# Patient Record
Sex: FEMALE | Race: WHITE | HISPANIC OR LATINO | Employment: FULL TIME | URBAN - METROPOLITAN AREA
[De-identification: names, ages, dates, MRNs, and addresses within clinical notes are randomized per-mention and may not be internally consistent; named-entity substitution may affect disease eponyms.]

---

## 2017-01-20 ENCOUNTER — OFFICE VISIT (OUTPATIENT)
Dept: FAMILY MEDICINE | Facility: OTHER | Age: 42
End: 2017-01-20
Payer: COMMERCIAL

## 2017-01-20 VITALS
BODY MASS INDEX: 33.49 KG/M2 | RESPIRATION RATE: 12 BRPM | WEIGHT: 189 LBS | DIASTOLIC BLOOD PRESSURE: 70 MMHG | HEIGHT: 63 IN | TEMPERATURE: 98.2 F | OXYGEN SATURATION: 100 % | HEART RATE: 76 BPM | SYSTOLIC BLOOD PRESSURE: 92 MMHG

## 2017-01-20 DIAGNOSIS — Z12.31 ENCOUNTER FOR SCREENING MAMMOGRAM FOR BREAST CANCER: ICD-10-CM

## 2017-01-20 DIAGNOSIS — Z00.00 ENCOUNTER FOR ROUTINE ADULT HEALTH EXAMINATION WITHOUT ABNORMAL FINDINGS: Primary | ICD-10-CM

## 2017-01-20 DIAGNOSIS — J45.30 MILD PERSISTENT ASTHMA WITHOUT COMPLICATION: ICD-10-CM

## 2017-01-20 PROCEDURE — 99386 PREV VISIT NEW AGE 40-64: CPT | Performed by: PHYSICIAN ASSISTANT

## 2017-01-20 ASSESSMENT — PAIN SCALES - GENERAL: PAINLEVEL: NO PAIN (0)

## 2017-01-20 NOTE — NURSING NOTE
"Chief Complaint   Patient presents with     Physical       Initial BP 92/70 mmHg  Pulse 76  Temp(Src) 98.2  F (36.8  C) (Oral)  Resp 12  Ht 5' 2.99\" (1.6 m)  Wt 189 lb (85.73 kg)  BMI 33.49 kg/m2  SpO2 100% Estimated body mass index is 33.49 kg/(m^2) as calculated from the following:    Height as of this encounter: 5' 2.99\" (1.6 m).    Weight as of this encounter: 189 lb (85.73 kg).  BP completed using cuff size: regular  "

## 2017-01-20 NOTE — PROGRESS NOTES
SUBJECTIVE:     CC: Florentino Mondragon is an 41 year old woman who presents for preventive health visit.     Physical  Annual:     Getting at least 3 servings of Calcium per day::  Yes    Bi-annual eye exam::  Yes    Dental care twice a year::  Yes    Sleep apnea or symptoms of sleep apnea::  None    Diet::  Regular (no restrictions)    Frequency of exercise::  4-5 days/week    Duration of exercise::  45-60 minutes    Taking medications regularly::  Yes    Medication side effects::  None    Additional concerns today::  YES      Pt would like provider to listen to heart  - States after working out she gets dark circles under her eyes  - Has goggled it and thinks its a murmur or arrhythmia   - No fatigue, no heart racing   - Had panic attacks before   - No pain   - Doesn't cause her to get off exercise     Asthma Follow-Up    Was ACT completed today?    Yes    ACT Total Scores 4/29/2016   ACT TOTAL SCORE -   ASTHMA ER VISITS -   ASTHMA HOSPITALIZATIONS -   ACT TOTAL SCORE (Goal Greater than or Equal to 20) 25   In the past 12 months, how many times did you visit the emergency room for your asthma without being admitted to the hospital? 0   In the past 12 months, how many times were you hospitalized overnight because of your asthma? 0             Today's PHQ-2 Score:   PHQ-2 ( 1999 Pfizer) 4/29/2016   Q1: Little interest or pleasure in doing things 0   Q2: Feeling down, depressed or hopeless 0   PHQ-2 Score 0       Abuse: Current or Past(Physical, Sexual or Emotional)- No  Do you feel safe in your environment - Yes    Social History   Substance Use Topics     Smoking status: Former Smoker     Quit date: 03/10/1999     Smokeless tobacco: Never Used     Alcohol Use: Yes      Comment: rare     The patient does not drink >3 drinks per day nor >7 drinks per week.    Recent Labs   Lab Test  11/19/14   0902  05/20/10   0844   CHOL  165  184   HDL  53  39*   LDL  101  127   TRIG  56  91   CHOLHDLRATIO  3.1  5.0       Reviewed  "orders with patient.  Reviewed health maintenance and updated orders accordingly - Yes    Mammo Decision Support:  Patient under age 50, mutual decision reflected in health maintenance.      Pertinent mammograms are reviewed under the imaging tab.  History of abnormal Pap smear: NO - age 30-65 PAP every 5 years with negative HPV co-testing recommended  All Histories reviewed and updated in Epic.  Past Medical History   Diagnosis Date     Seizure disorder (H) 6/17/2008     Colon polyps 12/3/14     Diverticulosis 12/3/14      Past Surgical History   Procedure Laterality Date     Lasik       3/18/2010     Gyn surgery  2000     LEEP      Colonoscopy with co2 insufflation N/A 12/3/2014     Procedure: COLONOSCOPY WITH CO2 INSUFFLATION;  Surgeon: Duane, William Charles, MD;  Location: [facility] OR       ROS:  C: NEGATIVE for fever, chills, change in weight  I: NEGATIVE for worrisome rashes, moles or lesions  E: NEGATIVE for vision changes or irritation  ENT: NEGATIVE for ear, mouth and throat problems  R: NEGATIVE for significant cough or SOB  B: NEGATIVE for masses, tenderness or discharge  CV: NEGATIVE for chest pain, palpitations or peripheral edema  GI: NEGATIVE for nausea, abdominal pain, heartburn, or change in bowel habits  : NEGATIVE for unusual urinary or vaginal symptoms. Periods are regular.  M: NEGATIVE for significant arthralgias or myalgia  N: NEGATIVE for weakness, dizziness or paresthesias  P: NEGATIVE for changes in mood or affect    Problem list, Medication list, Allergies, and Medical/Social/Surgical histories reviewed in [facility] and updated as appropriate.  Labs reviewed in EPIC  OBJECTIVE:     BP 92/70 mmHg  Pulse 76  Temp(Src) 98.2  F (36.8  C) (Oral)  Resp 12  Ht 5' 2.99\" (1.6 m)  Wt 189 lb (85.73 kg)  BMI 33.49 kg/m2  SpO2 100%  EXAM:  GENERAL: healthy, alert and no distress  EYES: Eyes grossly normal to inspection, PERRL and conjunctivae and sclerae normal  HENT: ear canals and TM's normal, nose and " "mouth without ulcers or lesions  NECK: no adenopathy, no asymmetry, masses, or scars and thyroid normal to palpation  RESP: lungs clear to auscultation - no rales, rhonchi or wheezes  CV: regular rate and rhythm, normal S1 S2, no S3 or S4, no murmur, click or rub, no peripheral edema and peripheral pulses strong  ABDOMEN: soft, nontender, no hepatosplenomegaly, no masses and bowel sounds normal  MS: no gross musculoskeletal defects noted, no edema  SKIN: no suspicious lesions or rashes  NEURO: Normal strength and tone, mentation intact and speech normal  PSYCH: mentation appears normal, affect normal/bright    ASSESSMENT/PLAN:         ICD-10-CM    1. Encounter for routine adult health examination without abnormal findings Z00.00    2. Encounter for screening mammogram for breast cancer Z12.31 *MA Screening Digital Bilateral   3. Mild persistent asthma without complication J45.30        - OK to refill albuterol when needed   - Patient to schedule mammogram  - Assured patient no cardiac concerns on exam today, likely dark circles from electrolytes/dehydration     COUNSELING:  Reviewed preventive health counseling, as reflected in patient instructions  Special attention given to:        Regular exercise       Healthy diet/nutrition         reports that she quit smoking about 17 years ago. She has never used smokeless tobacco.    Estimated body mass index is 31.29 kg/(m^2) as calculated from the following:    Height as of 4/29/16: 5' 2.64\" (1.591 m).    Weight as of 4/29/16: 174 lb 9.6 oz (79.198 kg).   Weight management plan: Discussed healthy diet and exercise guidelines and patient will follow up in 12 months in clinic to re-evaluate.    Counseling Resources:  ATP IV Guidelines  Pooled Cohorts Equation Calculator  Breast Cancer Risk Calculator  FRAX Risk Assessment  ICSI Preventive Guidelines  Dietary Guidelines for Americans, 2010  USDA's MyPlate  ASA Prophylaxis  Lung CA Screening    Chelsea Cueva, " THA  [facility]    Answers for HPI/ROS submitted by the patient on 1/20/2017   PHQ-2 Depressed: Not at all, Not at all  PHQ-2 Score: 0

## 2017-01-21 ASSESSMENT — ASTHMA QUESTIONNAIRES: ACT_TOTALSCORE: 22

## 2018-01-21 ENCOUNTER — HEALTH MAINTENANCE LETTER (OUTPATIENT)
Age: 43
End: 2018-01-21

## 2018-05-01 ENCOUNTER — TELEPHONE (OUTPATIENT)
Dept: FAMILY MEDICINE | Facility: OTHER | Age: 43
End: 2018-05-01

## 2018-05-01 NOTE — TELEPHONE ENCOUNTER
Panel Management Review      Patient has the following on her problem list:     Asthma review     ACT Total Scores 1/20/2017   ACT TOTAL SCORE -   ASTHMA ER VISITS -   ASTHMA HOSPITALIZATIONS -   ACT TOTAL SCORE (Goal Greater than or Equal to 20) 22   In the past 12 months, how many times did you visit the emergency room for your asthma without being admitted to the hospital? 0   In the past 12 months, how many times were you hospitalized overnight because of your asthma? 0      1. Is Asthma diagnosis on the Problem List? Yes    2. Is Asthma listed on Health Maintenance? Yes    3. Patient is due for:  ACT and AAP      Composite cancer screening  Chart review shows that this patient is due/due soon for the following Pap Smear and Mammogram  Summary:    Patient is due/failing the following:   AAP, ACT, MAMMOGRAM and PHYSICAL    Action needed:   Patient needs office visit for physical, pap, ACT.    Type of outreach:    Phone, spoke to patient.  scheduled for physical 5/8/18    Questions for provider review:    None                                                                                                                                    Shasha Reed CMA        Chart routed to Care Team .

## 2018-05-14 ENCOUNTER — TELEPHONE (OUTPATIENT)
Dept: FAMILY MEDICINE | Facility: OTHER | Age: 43
End: 2018-05-14

## 2019-06-13 NOTE — PATIENT INSTRUCTIONS
- Schedule fasting labs     - Schedule mammogram     - Schedule pap only appointment     - Colonoscopy Dec 2019, call for order       Preventive Health Recommendations  Female Ages 40 to 49    Yearly exam:     See your health care provider every year in order to  1. Review health changes.   2. Discuss preventive care.    3. Review your medicines if your doctor prescribed any.      Get a Pap test every three years (unless you have an abnormal result and your provider advises testing more often).      If you get Pap tests with HPV test, you only need to test every 5 years, unless you have an abnormal result. You do not need a Pap test if your uterus was removed (hysterectomy) and you have not had cancer.      You should be tested each year for STDs (sexually transmitted diseases), if you're at risk.     Ask your doctor if you should have a mammogram.      Have a colonoscopy (test for colon cancer) if someone in your family has had colon cancer or polyps before age 50.       Have a cholesterol test every 5 years.       Have a diabetes test (fasting glucose) after age 45. If you are at risk for diabetes, you should have this test every 3 years.    Shots: Get a flu shot each year. Get a tetanus shot every 10 years.     Nutrition:     Eat at least 5 servings of fruits and vegetables each day.    Eat whole-grain bread, whole-wheat pasta and brown rice instead of white grains and rice.    Get adequate Calcium and Vitamin D.      Lifestyle    Exercise at least 150 minutes a week (an average of 30 minutes a day, 5 days a week). This will help you control your weight and prevent disease.    Limit alcohol to one drink per day.    No smoking.     Wear sunscreen to prevent skin cancer.    See your dentist every six months for an exam and cleaning.  Patient Education     Understanding Menopause  Menopause marks the point where you ve gone 12 months in a row without a period. The average age for this is around 51, but it can  happen at younger or older ages. During the months or years before menopause, your body goes through many changes. It may be helpful to understand these changes and what you can do about the symptoms that result.     Use a portable fan to help stay cool.    Symptoms  Perimenopause is sometimes called the menopause transition. It happens in the months or years before menopause. It may begin when you reach your mid-40s. During this time, your estrogen levels go up and down and then decrease. As a result, you may notice some of the following symptoms:  Menstrual periods that come more or less often than usual  Menstrual periods that are lighter or heavier than normal  Increased premenstrual syndrome (PMS) symptoms  Hot flashes  Night sweats  Mood swings  Vaginal dryness with possible painful sexual activity  Difficulty going to sleep or staying asleep  Decreased sexual drive and function  Urinating frequently  It is important to remember that you could become pregnant until 12 months have passed since your last menstrual period. Ask your healthcare provider about birth control choices.   Controlling symptoms  Your healthcare provider may suggest pills or an intrauterine device (IUD) that contain the hormone progesterone. This can make your periods more regular and prevent excess bleeding. If you have symptoms due to lower estrogen levels, your healthcare provider may suggest pills that contain estrogen and/or progesterone. This is called hormone therapy (HT).  There are also other prescription medicines that help control some of the bothersome symptoms, like hot flashes, mood swings, and vaginal dryness.  Other ways for you to deal with symptoms are listed below.  Hot flashes. Wear layers that you can remove. Try all-cotton clothing, sheets, and blankets. Keep a glass of cold water by your bed.  Pain during sex. You can buy a water-based lubricant or vaginal moisturizer in the drugstore that may help. Your healthcare  provider may also prescribe an estrogen cream for your vagina.  Mood swings. Talking to friends who are going through the same changes can sometimes help.  Date Last Reviewed: 12/1/2016 2000-2018 [employer]. [address] [place] [address], [place] [address]. All rights reserved. This information is not intended as a substitute for professional medical care. Always follow your healthcare professional's instructions.           Patient Education     Coping with Symptoms of Menopause  What symptoms of menopause may occur with my treatment?  Chemotherapy drugs or medicines that block hormones may bring on menopause.  These changes may include:  Hot flashes  Vaginal dryness  Trouble falling asleep (insomnia)  Headache  Depression.  How are hot flashes treated?  Your doctor may suggest medicine to control the hot flashes. Vitamins E, B6 or C may also help. Talk to your doctor about how much to take.  Some herbs or foods may help: primrose oil, garlic, chamomile, ginseng root, royal jelly or soy.  What else can I do to cope with hot flashes?  Wear clothes made of natural fabric such as cotton.  Dress in layers. You can remove them when you feel too warm.  Avoid hot drinks (coffee or tea) and spicy foods.  Keep the room temperature low if you can.  Control your stress. Exercise and relaxation techniques may help.  Keep track of when and how often hot flashes occur. Note what is happening right before a hot flash. This may give you some control over future hot flashes.  How is vaginal dryness treated?  You can try drugstore products such as Replens, Gyne-Moistrin or Lubrin. They last for about three days.  Use water-based lubricants during sex. These include Astroglide and K-Y Jelly. Do not use Vaseline or petroleum jelly because they are not good for vaginal tissues.  Use low-dose estrogen cream in the vagina. Your doctor must prescribe this medicine.  How can I cope if I have trouble sleeping?  Get in bed when you  are ready to go to sleep. Do not watch TV or read in bed.  Follow a sleeping routine: Go to bed and get up at the same times. Get up on time even if you did not sleep well.  If you do not fall asleep within 30 minutes, get up.  Get regular exercise. Do not exercise right before bedtime.  Avoid alcohol. It may disrupt your sleep.  Try natural remedies just before bedtime such as warm milk, chamomile tea or verbena tea.  How can I treat headache?  Ask your doctor if it is safe to take aspirin, Tylenol (acetaminophen) or Advil (ibuprofen). They may cause side effects when mixed with chemotherapy.  How can I cope with depression?  Mild depression  Start an exercise program. Exercise with a friend. Any activity is better than none. Walk to the mailbox, to see your neighbors or in the mall.  Share your feelings with family and friends.  Don't give in to negative feelings.  Severe depression  If your depression lasts longer than two weeks, talk to your care team. They may suggest counseling or medicine.  Comments:  __________________________________________  __________________________________________  __________________________________________  __________________________________________  __________________________________________  __________________________________________  __________________________________________  __________________________________________  For informational purposes only. Not to replace the advice of your health care provider.  Copyright   2010 [employer]. All rights reserved. [employer] 804408 - 12/15.  For informational purposes only. Not to replace the advice of your health care provider.  Copyright   2018 [employer]. All rights reserved.

## 2019-06-13 NOTE — PROGRESS NOTES
SUBJECTIVE:   CC: Florentino Mondragon is an 43 year old woman who presents for preventive health visit.     Healthy Habits:     Getting at least 3 servings of Calcium per day:  Yes    Bi-annual eye exam:  Yes    Dental care twice a year:  Yes    Sleep apnea or symptoms of sleep apnea:  None    Diet:  Regular (no restrictions)    Frequency of exercise:  4-5 days/week    Duration of exercise:  45-60 minutes    Taking medications regularly:  Yes    Medication side effects:  None    PHQ-2 Total Score: 0    Additional concerns today:  Yes    - No pap today per patient, just started period     - HPV warts on bikini lines (3 of them), treated in office in the past         - Referral to neurology for refill of medications     - Hot flashes just started     Periods regular       Asthma Follow-Up    Was ACT completed today?    Yes    ACT Total Scores 6/20/2019   ACT TOTAL SCORE -   ASTHMA ER VISITS -   ASTHMA HOSPITALIZATIONS -   ACT TOTAL SCORE (Goal Greater than or Equal to 20) 24   In the past 12 months, how many times did you visit the emergency room for your asthma without being admitted to the hospital? 0   In the past 12 months, how many times were you hospitalized overnight because of your asthma? 0       How many days per week do you miss taking your asthma controller medication?  I do not have an asthma controller medication    Please describe any recent triggers for your asthma: dust mites and animal dander    Have you had any Emergency Room Visits, Urgent Care Visits, or Hospital Admissions since your last office visit?  No        Today's PHQ-2 Score:   PHQ-2 ( 1999 Pfizer) 1/20/2017   Q1: Little interest or pleasure in doing things -   Q2: Feeling down, depressed or hopeless -   PHQ-2 Score -   Q1: Little interest or pleasure in doing things Not at all   Q2: Feeling down, depressed or hopeless Not at all   PHQ-2 Score 0       Abuse: Current or Past(Physical, Sexual or Emotional)- No  Do you feel safe in your  environment? Yes    Social History     Tobacco Use     Smoking status: Former Smoker     Last attempt to quit: 3/10/1999     Years since quittin.2     Smokeless tobacco: Never Used   Substance Use Topics     Alcohol use: Yes     Comment: rare     If you drink alcohol do you typically have >3 drinks per day or >7 drinks per week? No    Alcohol Use 2017   Prescreen: >3 drinks/day or >7 drinks/week? The patient does not drink >3 drinks per day nor >7 drinks per week.   No flowsheet data found.    Reviewed orders with patient.  Reviewed health maintenance and updated orders accordingly - Yes  Lab work is in process  Labs reviewed in EPIC  BP Readings from Last 3 Encounters:   17 92/70   16 114/72   12/16/15 110/66    Wt Readings from Last 3 Encounters:   17 85.7 kg (189 lb)   16 79.2 kg (174 lb 9.6 oz)   12/16/15 85.1 kg (187 lb 9.6 oz)            Mammogram Screening: Patient under age 50, mutual decision reflected in health maintenance.      Pertinent mammograms are reviewed under the imaging tab.  History of abnormal Pap smear:   Last 3 Pap Results:   PAP (no units)   Date Value   2014 NIL   2011 NIL   2010 NIL     PAP / HPV 2014   PAP NIL NIL NIL     Cervical dysplasia - had LEEP 18 years ago        Reviewed and updated as needed this visit by clinical staff  Allergies  Meds  Problems  Med Hx  Surg Hx         Reviewed and updated as needed this visit by Provider  Allergies  Meds  Problems  Med Hx  Surg Hx        Past Medical History:   Diagnosis Date     Colon polyps 12/3/14     Diverticulosis 12/3/14     Seizure disorder (H) 2008      Past Surgical History:   Procedure Laterality Date     COLONOSCOPY WITH CO2 INSUFFLATION N/A 12/3/2014    Procedure: COLONOSCOPY WITH CO2 INSUFFLATION;  Surgeon: Duane, William Charles, MD;  Location: [facility] OR     GYN SURGERY      LEEP      LASIK      3/18/2010       Review of Systems  "  Constitutional: Negative for chills and fever.   HENT: Negative for congestion, ear pain, hearing loss and sore throat.    Eyes: Negative for pain and visual disturbance.   Respiratory: Negative for cough and shortness of breath.    Cardiovascular: Negative for chest pain, palpitations and peripheral edema.   Gastrointestinal: Negative for abdominal pain, constipation, diarrhea, heartburn, hematochezia and nausea.   Breasts:  Negative for tenderness, breast mass and discharge.   Genitourinary: Negative for dysuria, frequency, genital sores, hematuria, pelvic pain, urgency, vaginal bleeding and vaginal discharge.   Musculoskeletal: Negative for arthralgias, joint swelling and myalgias.   Skin: Negative for rash.   Neurological: Negative for dizziness, weakness, headaches and paresthesias.   Psychiatric/Behavioral: Negative for mood changes. The patient is not nervous/anxious.    All other systems reviewed and are negative.         OBJECTIVE:   /74 (BP Location: Left arm, Patient Position: Chair, Cuff Size: Adult Regular)   Pulse 87   Temp 97.6  F (36.4  C) (Temporal)   Resp 16   Ht 1.58 m (5' 2.21\")   Wt 75.8 kg (167 lb)   LMP 06/20/2019   SpO2 98%   BMI 30.34 kg/m    Physical Exam   Constitutional: She is oriented to person, place, and time. She appears well-developed and well-nourished. No distress.   HENT:   Right Ear: Tympanic membrane and external ear normal.   Left Ear: Tympanic membrane and external ear normal.   Nose: Nose normal.   Mouth/Throat: Oropharynx is clear and moist. No oropharyngeal exudate.   Eyes: Pupils are equal, round, and reactive to light. Conjunctivae are normal. Right eye exhibits no discharge. Left eye exhibits no discharge.   Neck: Neck supple. No tracheal deviation present. No thyromegaly present.   Cardiovascular: Normal rate, regular rhythm, S1 normal, S2 normal, normal heart sounds and normal pulses. Exam reveals no S3, no S4 and no friction rub.   No murmur " heard.  Pulmonary/Chest: Effort normal and breath sounds normal. No respiratory distress. She has no wheezes. She has no rales.   Abdominal: Soft. Bowel sounds are normal. She exhibits no mass. There is no hepatosplenomegaly. There is no tenderness.   Musculoskeletal: Normal range of motion. She exhibits no edema.   Lymphadenopathy:     She has no cervical adenopathy.   Neurological: She is alert and oriented to person, place, and time. She has normal strength and normal reflexes. She exhibits normal muscle tone.   Skin: Skin is warm and dry. No rash noted.   Psychiatric: She has a normal mood and affect. Judgment and thought content normal. Cognition and memory are normal.   patient declined breast and pelvic exam today, will schedule for another time     Diagnostic Test Results:  Labs reviewed in Epic  See orders pending in Epic     ASSESSMENT/PLAN:       ICD-10-CM    1. Routine general medical examination at a health care facility Z00.00    2. Intermittent asthma, uncomplicated J45.20 albuterol (PROAIR HFA/PROVENTIL HFA/VENTOLIN HFA) 108 (90 Base) MCG/ACT inhaler   3. Screening for lipid disorders Z13.220 Lipid panel reflex to direct LDL Fasting   4. Screening for diabetes mellitus Z13.1 **Glucose FUTURE 2mo   5. Encounter for screening mammogram for breast cancer Z12.31 *MA Screening Digital Bilateral   6. Seizure disorder (H) G40.909 Lamotrigine Level     NEUROLOGY ADULT REFERRAL     lamoTRIgine (LAMICTAL) 100 MG tablet   7. Genital warts due to HPV (human papillomavirus) A63.0    8. Perimenopause N95.1    9. Menopausal syndrome (hot flashes) N95.1    10. External hemorrhoids K64.4    11. History of colonic  - colonoscopy every 5 years Z86.010      - Asthma     Stable, reviewed albuterol use and side effects     - Referral to neurology given     - Schedule fasting labs     - Schedule mammogram     - Schedule pap only appointment - will treat HPV warts and look at hemorrhoid at that time per patient request  "    - Colonoscopy Dec 2019, call for order         COUNSELING:  Reviewed preventive health counseling, as reflected in patient instructions  Special attention given to:        Regular exercise       Healthy diet/nutrition       Colon cancer screening       (Sepideh)menopause management    Estimated body mass index is 33.49 kg/m  as calculated from the following:    Height as of 1/20/17: 1.6 m (5' 2.99\").    Weight as of 1/20/17: 85.7 kg (189 lb).    Weight management plan: Discussed healthy diet and exercise guidelines     reports that she quit smoking about 20 years ago. She has never used smokeless tobacco.      Counseling Resources:  ATP IV Guidelines  Pooled Cohorts Equation Calculator  Breast Cancer Risk Calculator  FRAX Risk Assessment  ICSI Preventive Guidelines  Dietary Guidelines for Americans, 2010  USDA's MyPlate  ASA Prophylaxis  Lung CA Screening    Chelsea Cueva PA-C  [facility]  "

## 2019-06-20 ENCOUNTER — OFFICE VISIT (OUTPATIENT)
Dept: FAMILY MEDICINE | Facility: OTHER | Age: 44
End: 2019-06-20
Payer: COMMERCIAL

## 2019-06-20 VITALS
OXYGEN SATURATION: 98 % | TEMPERATURE: 97.6 F | WEIGHT: 167 LBS | DIASTOLIC BLOOD PRESSURE: 74 MMHG | SYSTOLIC BLOOD PRESSURE: 124 MMHG | BODY MASS INDEX: 30.73 KG/M2 | RESPIRATION RATE: 16 BRPM | HEART RATE: 87 BPM | HEIGHT: 62 IN

## 2019-06-20 DIAGNOSIS — N95.1 MENOPAUSAL SYNDROME (HOT FLASHES): ICD-10-CM

## 2019-06-20 DIAGNOSIS — A63.0 GENITAL WARTS DUE TO HPV (HUMAN PAPILLOMAVIRUS): ICD-10-CM

## 2019-06-20 DIAGNOSIS — Z13.1 SCREENING FOR DIABETES MELLITUS: ICD-10-CM

## 2019-06-20 DIAGNOSIS — K64.4 EXTERNAL HEMORRHOIDS: ICD-10-CM

## 2019-06-20 DIAGNOSIS — Z86.0100 HISTORY OF COLONIC POLYPS: ICD-10-CM

## 2019-06-20 DIAGNOSIS — Z13.220 SCREENING FOR LIPID DISORDERS: ICD-10-CM

## 2019-06-20 DIAGNOSIS — N95.1 PERIMENOPAUSE: ICD-10-CM

## 2019-06-20 DIAGNOSIS — J45.20 INTERMITTENT ASTHMA, UNCOMPLICATED: ICD-10-CM

## 2019-06-20 DIAGNOSIS — Z12.31 ENCOUNTER FOR SCREENING MAMMOGRAM FOR BREAST CANCER: ICD-10-CM

## 2019-06-20 DIAGNOSIS — G40.909 SEIZURE DISORDER (H): ICD-10-CM

## 2019-06-20 DIAGNOSIS — Z00.00 ROUTINE GENERAL MEDICAL EXAMINATION AT A HEALTH CARE FACILITY: Primary | ICD-10-CM

## 2019-06-20 PROCEDURE — 99396 PREV VISIT EST AGE 40-64: CPT | Performed by: PHYSICIAN ASSISTANT

## 2019-06-20 RX ORDER — LAMOTRIGINE 100 MG/1
100 TABLET ORAL 2 TIMES DAILY
Qty: 60 TABLET | Refills: 2 | Status: SHIPPED | OUTPATIENT
Start: 2019-06-20

## 2019-06-20 RX ORDER — ALBUTEROL SULFATE 90 UG/1
1-2 AEROSOL, METERED RESPIRATORY (INHALATION) EVERY 4 HOURS PRN
Qty: 3 INHALER | Refills: 3 | Status: SHIPPED | OUTPATIENT
Start: 2019-06-20 | End: 2020-02-06

## 2019-06-20 ASSESSMENT — ENCOUNTER SYMPTOMS
PALPITATIONS: 0
FEVER: 0
HEARTBURN: 0
WEAKNESS: 0
CHILLS: 0
NAUSEA: 0
HEMATOCHEZIA: 0
PARESTHESIAS: 0
FREQUENCY: 0
DYSURIA: 0
ARTHRALGIAS: 0
SHORTNESS OF BREATH: 0
HEADACHES: 0
MYALGIAS: 0
CONSTIPATION: 0
NERVOUS/ANXIOUS: 0
SORE THROAT: 0
JOINT SWELLING: 0
HEMATURIA: 0
EYE PAIN: 0
DIARRHEA: 0
BREAST MASS: 0
ABDOMINAL PAIN: 0
COUGH: 0
DIZZINESS: 0

## 2019-06-20 ASSESSMENT — ASTHMA QUESTIONNAIRES
QUESTION_2 LAST FOUR WEEKS HOW OFTEN HAVE YOU HAD SHORTNESS OF BREATH: NOT AT ALL
ACT_TOTALSCORE: 24
ACUTE_EXACERBATION_TODAY: NO
QUESTION_4 LAST FOUR WEEKS HOW OFTEN HAVE YOU USED YOUR RESCUE INHALER OR NEBULIZER MEDICATION (SUCH AS ALBUTEROL): ONCE A WEEK OR LESS
QUESTION_5 LAST FOUR WEEKS HOW WOULD YOU RATE YOUR ASTHMA CONTROL: COMPLETELY CONTROLLED
QUESTION_1 LAST FOUR WEEKS HOW MUCH OF THE TIME DID YOUR ASTHMA KEEP YOU FROM GETTING AS MUCH DONE AT WORK, SCHOOL OR AT HOME: NONE OF THE TIME
QUESTION_3 LAST FOUR WEEKS HOW OFTEN DID YOUR ASTHMA SYMPTOMS (WHEEZING, COUGHING, SHORTNESS OF BREATH, CHEST TIGHTNESS OR PAIN) WAKE YOU UP AT NIGHT OR EARLIER THAN USUAL IN THE MORNING: NOT AT ALL

## 2019-06-20 ASSESSMENT — MIFFLIN-ST. JEOR: SCORE: 1354.01

## 2019-06-20 ASSESSMENT — PAIN SCALES - GENERAL: PAINLEVEL: NO PAIN (0)

## 2019-06-20 NOTE — LETTER
My Asthma Action Plan  Name: Florentino Mondragon   YOB: 1975  Date: 6/20/2019   My doctor: Chelsea Cueva PA-C   My clinic: [facility]        My Control Medicine: None  My Rescue Medicine: Albuterol (Proair/Ventolin/Proventil) inhaler 1-2 puffs every 4 horus as needed    My Asthma Severity: mild persistent  Avoid your asthma triggers:                GREEN ZONE   Good Control    I feel good    No cough or wheeze    Can work, sleep and play without asthma symptoms       Take your asthma control medicine every day.     1. If exercise triggers your asthma, take your rescue medication    15 minutes before exercise or sports, and    During exercise if you have asthma symptoms  2. Spacer to use with inhaler: If you have a spacer, make sure to use it with your inhaler             YELLOW ZONE Getting Worse  I have ANY of these:    I do not feel good    Cough or wheeze    Chest feels tight    Wake up at night   1. Keep taking your Green Zone medications  2. Start taking your rescue medicine:    every 20 minutes for up to 1 hour. Then every 4 hours for 24-48 hours.  3. If you stay in the Yellow Zone for more than 12-24 hours, contact your doctor.  4. If you do not return to the Green Zone in 12-24 hours or you get worse, start taking your oral steroid medicine if prescribed by your provider.           RED ZONE Medical Alert - Get Help  I have ANY of these:    I feel awful    Medicine is not helping    Breathing getting harder    Trouble walking or talking    Nose opens wide to breathe       1. Take your rescue medicine NOW  2. If your provider has prescribed an oral steroid medicine, start taking it NOW  3. Call your doctor NOW  4. If you are still in the Red Zone after 20 minutes and you have not reached your doctor:    Take your rescue medicine again and    Call 911 or go to the emergency room right away    See your regular doctor within 2 weeks of an Emergency Room or Urgent Care  visit for follow-up treatment.          Annual Reminders:  Meet with Asthma Educator,  Flu Shot in the Fall, consider Pneumonia Vaccination for patients with asthma (aged 19 and older).    [facility] [id number] [facility] [id number] AT [facility] OF  & [address]                      Asthma Triggers  How To Control Things That Make Your Asthma Worse    Triggers are things that make your asthma worse.  Look at the list below to help you find your triggers and what you can do about them.  You can help prevent asthma flare-ups by staying away from your triggers.      Trigger                                                          What you can do   Cigarette Smoke  Tobacco smoke can make asthma worse. Do not allow smoking in your home, car or around you.  Be sure no one smokes at a child s day care or school.  If you smoke, ask your health care provider for ways to help you quit.  Ask family members to quit too.  Ask your health care provider for a referral to Quit Plan to help you quit smoking, or call 7-741-659-PLAN.     Colds, Flu, Bronchitis  These are common triggers of asthma. Wash your hands often.  Don t touch your eyes, nose or mouth.  Get a flu shot every year.     Dust Mites  These are tiny bugs that live in cloth or carpet. They are too small to see. Wash sheets and blankets in hot water every week.   Encase pillows and mattress in dust mite proof covers.  Avoid having carpet if you can. If you have carpet, vacuum weekly.   Use a dust mask and HEPA vacuum.   Pollen and Outdoor Mold  Some people are allergic to trees, grass, or weed pollen, or molds. Try to keep your windows closed.  Limit time out doors when pollen count is high.   Ask you health care provider about taking medicine during allergy season.     Animal Dander  Some people are allergic to skin flakes, urine or saliva from pets with fur or feathers. Keep pets with fur or feathers out of your home.    If you can t keep the  pet outdoors, then keep the pet out of your bedroom.  Keep the bedroom door closed.  Keep pets off cloth furniture and away from stuffed toys.     Mice, Rats, and Cockroaches  Some people are allergic to the waste from these pests.   Cover food and garbage.  Clean up spills and food crumbs.  Store grease in the refrigerator.   Keep food out of the bedroom.   Indoor Mold  This can be a trigger if your home has high moisture. Fix leaking faucets, pipes, or other sources of water.   Clean moldy surfaces.  Dehumidify basement if it is damp and smelly.   Smoke, Strong Odors, and Sprays  These can reduce air quality. Stay away from strong odors and sprays, such as perfume, powder, hair spray, paints, smoke incense, paint, cleaning products, candles and new carpet.   Exercise or Sports  Some people with asthma have this trigger. Be active!  Ask your doctor about taking medicine before sports or exercise to prevent symptoms.    Warm up for 5-10 minutes before and after sports or exercise.     Other Triggers of Asthma  Cold air:  Cover your nose and mouth with a scarf.  Sometimes laughing or crying can be a trigger.  Some medicines and food can trigger asthma.

## 2019-06-21 ASSESSMENT — ASTHMA QUESTIONNAIRES: ACT_TOTALSCORE: 24

## 2019-08-15 ENCOUNTER — TELEPHONE (OUTPATIENT)
Dept: FAMILY MEDICINE | Facility: OTHER | Age: 44
End: 2019-08-15

## 2019-08-21 ENCOUNTER — TELEPHONE (OUTPATIENT)
Dept: FAMILY MEDICINE | Facility: OTHER | Age: 44
End: 2019-08-21

## 2019-08-21 NOTE — TELEPHONE ENCOUNTER
You placed a referral to [facility] on 6/20/19.    It is unclear if the patient has scheduled yet, not finding a report showing they were seen.     Please forward to your team if further follow up is needed to see if they have made this appointment.      Thank you!   Leora/Referral Representative for [facility]

## 2019-09-05 NOTE — PROGRESS NOTES
"Subjective     Florentino Mondragon is a 44 year old female who presents to clinic today for the following health issues:    History of Present Illness        She eats 2-3 servings of fruits and vegetables daily.She consumes 0 sweetened beverage(s) daily.  She is taking medications regularly.     -pap only today  -burn off HPV?      Review of Systems   ROS COMP: Constitutional, HEENT, cardiovascular, pulmonary, gi and gu systems are negative, except as otherwise noted.      Objective    /62   Pulse 106   Temp 97.9  F (36.6  C) (Temporal)   Resp 16   Ht 1.58 m (5' 2.21\")   Wt 68.2 kg (150 lb 6.4 oz)   LMP 08/08/2019   SpO2 97%   BMI 27.32 kg/m    Body mass index is 27.32 kg/m .  Physical Exam   GENERAL APPEARANCE: healthy, alert and no distress  EYES: Eyes grossly normal to inspection, PERRLA, conjunctivae and sclerae without injection or discharge, EOM intact     (FEMALE): Normal female external genitalia, - 3 brown raised genital warts located on right outer labia vaginal mucosa pink, moist, well rugated, normal vaginal discharge present, normal cervix visualized without any signs of abnormal discharge, bimanual exam reveals normal adnexae and uterus without masses.   Rectal: moderate sized non-thrombosed external hemorrhoid noted at 12 o'clock position    MS: No musculoskeletal defects are noted and gait is age appropriate without ataxia   SKIN: No suspicious lesions or rashes, hydration status appears adeuqate with normal skin turgor   PSYCH: Alert and oriented x3; speech- coherent , normal rate and volume; able to articulate logical thoughts, able to abstract reason, no tangential thoughts, no hallucinations or delusions, mentation appears normal, Mood is euthymic. Affect is appropriate for this mood state and bright. Thought content is free of suicidal ideation, hallucinations, and delusions. Dress is adequate and upkept. Eye contact is good during conversation.       Diagnostic Test Results:  Labs " reviewed in Epic  See orders pending in Epic       Procedure note:   Procedure was discussed with patient. Site(s) as described above were identified and cleaned with alcohol.          Wart(s) were encircled by anti-biotic ointment to create a dam. Cantharidin was placed in center directly on wart. Area was covered by bandage. Patient tolerated procedure well.           Patient was given instruction on after care and hand out on warts.     Assessment & Plan       ICD-10-CM    1. Screening for malignant neoplasm of cervix Z12.4 Pap imaged thin layer screen with HPV - recommended age 30 - 65 years (select HPV order below)     HPV High Risk Types DNA Cervical   2. 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 External hemorrhoids K64.4 GENERAL SURG ADULT REFERRAL     1. Pap collected     2. Genital warts   - Treated as above after verbal consent obtained and discussing risks and benefits of treatments   - Patient instructed to wash with soap in 2 hours   - Discussed at home care if warts persist with podofilox  - Discussed when to start this and side effects   - Hand out given   - Discussed if persist, could try liquid nitrogen or surgical removal     3. External hemorrhoid   - Bothersome to patient and often bleeds   - Discussed etiology and treatment options, patient would like to proceed with possible treatment so referral to general surgery placed     The patient indicates understanding of these issues and agrees with the plan.    Follow up: PRN and 1 year for physical     Chelsea Sewell-THA Posadas  [facility]

## 2019-09-12 ENCOUNTER — OFFICE VISIT (OUTPATIENT)
Dept: FAMILY MEDICINE | Facility: OTHER | Age: 44
End: 2019-09-12
Payer: COMMERCIAL

## 2019-09-12 VITALS
BODY MASS INDEX: 27.68 KG/M2 | WEIGHT: 150.4 LBS | SYSTOLIC BLOOD PRESSURE: 118 MMHG | HEIGHT: 62 IN | HEART RATE: 106 BPM | RESPIRATION RATE: 16 BRPM | DIASTOLIC BLOOD PRESSURE: 62 MMHG | OXYGEN SATURATION: 97 % | TEMPERATURE: 97.9 F

## 2019-09-12 DIAGNOSIS — A63.0 GENITAL WARTS DUE TO HPV (HUMAN PAPILLOMAVIRUS): ICD-10-CM

## 2019-09-12 DIAGNOSIS — Z12.4 SCREENING FOR MALIGNANT NEOPLASM OF CERVIX: Primary | ICD-10-CM

## 2019-09-12 DIAGNOSIS — K64.4 EXTERNAL HEMORRHOIDS: ICD-10-CM

## 2019-09-12 PROCEDURE — 87624 HPV HI-RISK TYP POOLED RSLT: CPT | Performed by: PHYSICIAN ASSISTANT

## 2019-09-12 PROCEDURE — 99213 OFFICE O/P EST LOW 20 MIN: CPT | Mod: 25 | Performed by: PHYSICIAN ASSISTANT

## 2019-09-12 PROCEDURE — 56501 DESTROY VULVA LESIONS SIM: CPT | Performed by: PHYSICIAN ASSISTANT

## 2019-09-12 PROCEDURE — G0145 SCR C/V CYTO,THINLAYER,RESCR: HCPCS | Performed by: PHYSICIAN ASSISTANT

## 2019-09-12 RX ORDER — PODOFILOX 5 MG/G
GEL TOPICAL 2 TIMES DAILY
Qty: 3.5 G | Refills: 1 | Status: SHIPPED | OUTPATIENT
Start: 2019-09-12 | End: 2019-11-11

## 2019-09-12 ASSESSMENT — PAIN SCALES - GENERAL: PAINLEVEL: NO PAIN (0)

## 2019-09-12 ASSESSMENT — MIFFLIN-ST. JEOR: SCORE: 1278.79

## 2019-09-12 NOTE — PATIENT INSTRUCTIONS
- Wash entire area off with soap in 2 hours   - Area may blister, if it does, do not pop, keep covered    - Wait about 2 weeks, if no change or returning, start treatment with Podofilox         Apply twice daily (morning and evening) for 3 consecutive days, then withhold use for 4 consecutive days; this cycle may be repeated up to 4 times until there is no visible wart tissue;    - Call for appointment with general surgery               Patient Education     Hemorrhoids    Hemorrhoids are swollen and inflamed veins inside the rectum and near the anus. The rectum is the last several inches of the colon. The anus is the passage between the rectum and the outside of the body.  Causes  The veins can become swollen due to increased pressure in them. This is most often caused by:    Chronic constipation or diarrhea    Straining when having a bowel movement    Sitting too long on the toilet    A low-fiber diet    Pregnancy  Symptoms    Bleeding from the rectum (this may be noticeable after bowel movements)    Lump near the anus    Itching around the anus    Pain around the anus  There are different types of hemorrhoids. Depending on the type you have and the severity, you may be able to treat yourself at home. In some cases, a procedure may be the best treatment option. Your healthcare provider can tell you more about this, if needed.  Home care  General care    To get relief from pain or itching, try:  ? Medicines. Your healthcare provider may recommend stool softeners, suppositories, or laxatives to help manage constipation. Use these exactly as directed.  ? Sitz baths. A sitz bath involves sitting in a few inches of warm bath water. Be careful not to make the water so hot that you burn yourself--test it before sitting in it. Soak for about 10 to 15 minutes a few times a day. This may help relieve pain.  ? Topical products. Your healthcare provider may prescribe or recommend creams, ointments, or pads that can be applied  to the hemorrhoid. Use these exactly as directed.  Tips to help prevent hemorrhoids    Eat more fiber. Fiber adds bulk to stool and absorbs water as it moves through your colon. This makes stool softer and easier to pass.  ? Increase the fiber in your diet with more fiber-rich foods. These include fresh fruit, vegetables, and whole grains.  ? Take a fiber supplement or bulking agent, if advised by your healthcare provider. These include products such as psyllium or methylcellulose.    Drink more water. Your healthcare provider may direct you to drink plenty of water. This can help keep stool soft.    Be more active. Frequent exercise aids digestion and helps prevent constipation. It may also help make bowel movements more regular.    Don t strain during bowel movements. This can make hemorrhoids more likely. Also, don t sit on the toilet for long periods of time.  Follow-up care  Follow up with your healthcare provider as advised. If a culture or imaging tests were done, someone will let you know the results when they are ready. This may take a few days or longer. If your healthcare provider recommends a procedure for your hemorrhoids, these options can be discussed. Options may include surgery and outpatient office treatments.  When to seek medical advice  Call your healthcare provider right away if any of these occur:    Increased bleeding from the rectum    Increased pain around the rectum or anus    Weakness or dizziness  Call 911  Call 911 if any of these occur:    Trouble breathing or swallowing    Fainting or loss of consciousness    Unusually fast heart rate    Vomiting blood    Large amounts of blood in stool or black, tarry stools  Date Last Reviewed: 9/1/2017 2000-2018 [employer]. [address] [place] [address], [place] [address]. All rights reserved. This information is not intended as a substitute for professional medical care. Always follow your healthcare professional's instructions.

## 2019-09-18 LAB
COPATH REPORT: NORMAL
FINAL DIAGNOSIS: NORMAL
HPV HR 12 DNA CVX QL NAA+PROBE: NEGATIVE
HPV16 DNA SPEC QL NAA+PROBE: NEGATIVE
HPV18 DNA SPEC QL NAA+PROBE: NEGATIVE
PAP: NORMAL
SPECIMEN DESCRIPTION: NORMAL
SPECIMEN SOURCE CVX/VAG CYTO: NORMAL

## 2019-10-30 ENCOUNTER — TELEPHONE (OUTPATIENT)
Dept: FAMILY MEDICINE | Facility: OTHER | Age: 44
End: 2019-10-30

## 2019-10-30 NOTE — TELEPHONE ENCOUNTER
Panel Management Review    Summary:    Patient is due/failing the following:   Glucose, Lamotrigine Level, LDL and MAMMOGRAM    Action needed:   Patient needs fasting lab only appointment  Patient needs referral/order: Mammogram    Type of outreach:    Phone, left message for patient to call back.     Questions for provider review:    None                                                                                                                                    Arely Peck CMA ([facility])       Chart routed to Care Team .      Patient has the following on her problem list:     Asthma review     ACT Total Scores 6/20/2019   ACT TOTAL SCORE -   ASTHMA ER VISITS -   ASTHMA HOSPITALIZATIONS -   ACT TOTAL SCORE (Goal Greater than or Equal to 20) 24   In the past 12 months, how many times did you visit the emergency room for your asthma without being admitted to the hospital? 0   In the past 12 months, how many times were you hospitalized overnight because of your asthma? 0      1. Is Asthma diagnosis on the Problem List? Yes    2. Is Asthma listed on Health Maintenance? Yes    3. Patient is due for:  none      Composite cancer screening  Chart review shows that this patient is due/due soon for the following Mammogram

## 2019-10-31 NOTE — TELEPHONE ENCOUNTER
Patient informed. She will come in for a lab appointment. She didn't want to set up an appointment.   Also printed referrals for general surgery like she requested. Placed at the  for .

## 2019-11-04 ENCOUNTER — TELEPHONE (OUTPATIENT)
Dept: FAMILY MEDICINE | Facility: OTHER | Age: 44
End: 2019-11-04

## 2019-11-04 NOTE — TELEPHONE ENCOUNTER
Reason for Call:  Other call back    Detailed comments: Patient is looking for the name of the female doctor that CDL said would be a good fit for external hemorrhoid procedure. Patient stated okay to leave detailed VM with name of doctor.    Phone Number Patient can be reached at: Home number on file [phone] (home)    Best Time: any    Can we leave a detailed message on this number? YES    Call taken on 11/4/2019 at 8:13 AM by Halie Way

## 2019-11-07 ENCOUNTER — HEALTH MAINTENANCE LETTER (OUTPATIENT)
Age: 44
End: 2019-11-07

## 2019-11-11 ENCOUNTER — OFFICE VISIT (OUTPATIENT)
Dept: SURGERY | Facility: OTHER | Age: 44
End: 2019-11-11
Payer: COMMERCIAL

## 2019-11-11 VITALS
TEMPERATURE: 97.4 F | SYSTOLIC BLOOD PRESSURE: 130 MMHG | DIASTOLIC BLOOD PRESSURE: 76 MMHG | HEIGHT: 62 IN | BODY MASS INDEX: 26.31 KG/M2 | WEIGHT: 143 LBS

## 2019-11-11 DIAGNOSIS — Z13.1 SCREENING FOR DIABETES MELLITUS: ICD-10-CM

## 2019-11-11 DIAGNOSIS — A63.0 GENITAL WARTS DUE TO HPV (HUMAN PAPILLOMAVIRUS): ICD-10-CM

## 2019-11-11 DIAGNOSIS — Z13.220 SCREENING FOR LIPID DISORDERS: ICD-10-CM

## 2019-11-11 DIAGNOSIS — Z86.0100 HISTORY OF COLONIC POLYPS: ICD-10-CM

## 2019-11-11 DIAGNOSIS — G40.909 SEIZURE DISORDER (H): ICD-10-CM

## 2019-11-11 DIAGNOSIS — K64.4 EXTERNAL HEMORRHOIDS: Primary | ICD-10-CM

## 2019-11-11 LAB
CHOLEST SERPL-MCNC: 121 MG/DL
GLUCOSE SERPL-MCNC: 79 MG/DL (ref 70–99)
HDLC SERPL-MCNC: 57 MG/DL
LDLC SERPL CALC-MCNC: 55 MG/DL
NONHDLC SERPL-MCNC: 64 MG/DL
TRIGL SERPL-MCNC: 43 MG/DL

## 2019-11-11 PROCEDURE — 82947 ASSAY GLUCOSE BLOOD QUANT: CPT | Performed by: PHYSICIAN ASSISTANT

## 2019-11-11 PROCEDURE — 80175 DRUG SCREEN QUAN LAMOTRIGINE: CPT | Mod: 90 | Performed by: PHYSICIAN ASSISTANT

## 2019-11-11 PROCEDURE — 36415 COLL VENOUS BLD VENIPUNCTURE: CPT | Performed by: PHYSICIAN ASSISTANT

## 2019-11-11 PROCEDURE — 99000 SPECIMEN HANDLING OFFICE-LAB: CPT | Performed by: PHYSICIAN ASSISTANT

## 2019-11-11 PROCEDURE — 99204 OFFICE O/P NEW MOD 45 MIN: CPT | Performed by: SURGERY

## 2019-11-11 PROCEDURE — 80061 LIPID PANEL: CPT | Performed by: PHYSICIAN ASSISTANT

## 2019-11-11 ASSESSMENT — MIFFLIN-ST. JEOR: SCORE: 1241.89

## 2019-11-11 NOTE — LETTER
11/11/2019         RE: Florentino Mondragon  [address]  [facility] [place] [facility] [address]        Dear Colleague,    Thank you for referring your patient, Florentino Mondragon, to the [facility]. Please see a copy of my visit note below.    General Surgery Consultation    Florentino Mondragon MRN# [id number]   Age: 44 year old YOB: 1975     Reason for consult: External hemorrhoids and right groin skin lesion (previous HPV lesions)                        Assessment and Plan:   I was asked to see this patient at the request of Chapito Cueva  for evaluation of external hemorrhoids and right groin skin lesion .  Florentino Mondragon is a 44 year old female who presented with history, exam, laboratory and imaging most consistent with .       ICD-10-CM    1. External hemorrhoids K64.4    2. 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 History of colonic  - colonoscopy every 5 years Z86.010        Patient has very small external hemorrhoids and one small anterior midline skin tag; I do not recommend a hemorrhoidectomy at this point.  I recommend that she follows the conservative management as described below.  We discussed all the points in details all of her questions were answered regarding conservative management of her hemorrhoids.    -ingest 20 to 30 g of insoluble fiber (benefiber or metamucil) per day and drink plenty of water (1.5 to 2 liters per day). Both are necessary to produce regular, soft stools, which reduce straining at defecation. It could take six weeks to fully realize the beneficial effect of fiber   -refrain from straining or lingering (eg, reading) on the toilet.  - have regular physical exercise  - limit their intake of fatty foods and alcohol, which can exacerbate constipation   - Sitz baths can relieve irritation and pruritus as well as spasm of the anal sphincter muscles. Used with warm, rather than cold, water two to three times per day    Patient also has a right  groin skin lesion.  Stated she has history of genital warts secondary to HPV.  Patient has tried to burn this off the solution given by her primary care provider.  However she cannot afford that at this time.  This lesion is approximately 1.5 cm by half a centimeter.  Thus patient would like to have this be excised.  We talked about the risks, benefits, alternatives of the procedure the patient would like to proceed.  We will schedule an additional appointment just for the procedure.    I thank LYNSEY Renner  for the opportunity to participate in the patient's care.           Chief Complaint:   External hemorrhoids     History is obtained from the patient         History of Present Illness:   This patient is a 44 year old  female with a significant past medical history of HPV genital warts who presents with external hemorrhoids.    Past two years - get constipated intermittent.  WHen they flare-up - no pain, no itching, palpable, intermittent bleeding. Noted bleeding when she wipes.  No pattern for flare up.  Stool somewhat form.  Do read in toilet intermittent.  Does not do any bowel regimen unless she needs to.  NO crohn's and IBD; maternal aunt has crohn's and colitis (?related to her breast implants.).  Had colonoscopy ~5 years ago.  No hx of abscess, fissues, infection in perianal area.  No intra-abdominal surgeries.  Genital warps along the underwear line - 3x along right side underwear line - increasing in size.            Past Medical History:    has a past medical history of Colon polyps (12/3/14), Diverticulosis (12/3/14), and Seizure disorder (H) (6/17/2008).          Past Surgical History:     Past Surgical History:   Procedure Laterality Date     COLONOSCOPY WITH CO2 INSUFFLATION N/A 12/3/2014    Procedure: COLONOSCOPY WITH CO2 INSUFFLATION;  Surgeon: Duane, William Charles, MD;  Location: [facility] OR     GYN SURGERY  2000    LEEP      LASIK      3/18/2010           Medications:    lamoTRIgine (LAMICTAL) 100 MG tablet, Take 1 tablet (100 mg) by mouth 2 times daily  albuterol (PROAIR HFA/PROVENTIL HFA/VENTOLIN HFA) 108 (90 Base) MCG/ACT inhaler, Inhale 1-2 puffs into the lungs every 4 hours as needed for shortness of breath / dyspnea or wheezing (Patient not taking: Reported on 11/11/2019)  IBUPROFEN PO,   loratadine (CLARITIN) 10 MG tablet, Take 10 mg by mouth daily.    No current facility-administered medications on file prior to visit.         Allergies:      Allergies   Allergen Reactions     Dilantin [Phenytoin]      Dogs      Dust Mites      Keppra      Phenytoin      Seasonal Allergies      Trees             Social History:   Florentino Mondragon  reports that she quit smoking about 20 years ago. She has never used smokeless tobacco. She reports current alcohol use. She reports that she does not use drugs.          Family History:   The patient has no family history of any bleeding, clotting or anesthesia problems.          Review of Systems:     Constitutional: Denies fever or chills   Eyes: Denies change in visual acuity   HENT: Denies nasal congestion or sore throat   Respiratory: Denies cough or shortness of breath   Cardiovascular: Denies chest pain or edema   GI: Denies abdominal pain, nausea, vomiting, bloody stools or diarrhea   : Denies dysuria   Musculoskeletal: Denies back pain or joint pain   Integument: Denies rash   Neurologic: Denies headache, focal weakness or sensory changes   Endocrine: Denies polyuria or polydipsia   Lymphatic: Denies swollen glands   Psychiatric: Denies depression or anxiety          Physical Exam:     Constitutional: Awake, alert, no acute distress.  Eyes:  No scleral icterus.  Conjunctiva are without injection.  ENMT: Mucous membranes moist, dentition and gums are intact.   Neck: Soft, supple, trachea midline.    Endocrine: The thyroid is without masses and mobile with swallow.   Lymphatic: There is no cervical, submandibular, or inguinal  "adenopathy.  Respiratory: Lungs are clear to auscultation and percussion bilaterally.   Cardiovascular: Regular rate and rhythm. No murmurs, rubs, or gallops.    Abdomen: Non-distended, non-tender, normoactive bowel sounds present, No masses, neghernias, or flank tenderness. No hepatosplenomegaly.   Perianal and Anoscopy: no perianal irritation; anterior midline skin tag with small external hemorrhoid that noted some erythema but no signs of thrombosis.  No internal hemorrhoids noted; no fissure; no discharge; no masses; no bleeding noted.    Musculoskeletal: No spinal or CVA tenderness. Full range of motion in the upper and lower extremities.    Skin: No skin rashes or lesions to inspection.  No petechia.  Right groin - 1.5x0.5cm skin lesion likely genital wart.   Neurologic: Cranial nerves II through XII are grossly intact and symmetric.  Psychiatric: The patient is alert and oriented times 3.  The patient's affect is not blunted and mood is appropriate.          Data:   Vital Signs:  /76   Temp 97.4  F (36.3  C) (Temporal)   Ht 1.575 m (5' 2\")   Wt 64.9 kg (143 lb)   BMI 26.16 kg/m        WBC -   WBC   Date Value Ref Range Status   11/19/2014 5.7 4.0 - 11.0 10e9/L Final   ], HgB -   Hemoglobin   Date Value Ref Range Status   11/19/2014 11.6 (L) 11.7 - 15.7 g/dL Final   ]   Liver Function Studies -   Recent Labs   Lab Test 11/19/14  0902   PROTTOTAL 6.8   ALBUMIN 3.6   BILITOTAL 0.2   ALKPHOS 71   AST 10   ALT 18     No results found for this or any previous visit (from the past 744 hour(s)).     Mackenzie Salinas DO 11/11/2019 9:03 AM           Again, thank you for allowing me to participate in the care of your patient.        Sincerely,        Mackenzie Salinas MD    "

## 2019-11-11 NOTE — PROGRESS NOTES
General Surgery Consultation    Florentino Mondragon MRN# [id number]   Age: 44 year old YOB: 1975     Reason for consult: External hemorrhoids and right groin skin lesion (previous HPV lesions)                        Assessment and Plan:   I was asked to see this patient at the request of Chapito Cueva  for evaluation of external hemorrhoids and right groin skin lesion .  Florentino Mondragon is a 44 year old female who presented with history, exam, laboratory and imaging most consistent with .       ICD-10-CM    1. External hemorrhoids K64.4    2. 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 History of colonic  - colonoscopy every 5 years Z86.010        Patient has very small external hemorrhoids and one small anterior midline skin tag; I do not recommend a hemorrhoidectomy at this point.  I recommend that she follows the conservative management as described below.  We discussed all the points in details all of her questions were answered regarding conservative management of her hemorrhoids.    -ingest 20 to 30 g of insoluble fiber (benefiber or metamucil) per day and drink plenty of water (1.5 to 2 liters per day). Both are necessary to produce regular, soft stools, which reduce straining at defecation. It could take six weeks to fully realize the beneficial effect of fiber   -refrain from straining or lingering (eg, reading) on the toilet.  - have regular physical exercise  - limit their intake of fatty foods and alcohol, which can exacerbate constipation   - Sitz baths can relieve irritation and pruritus as well as spasm of the anal sphincter muscles. Used with warm, rather than cold, water two to three times per day    Patient also has a right groin skin lesion.  Stated she has history of genital warts secondary to HPV.  Patient has tried to burn this off the solution given by her primary care provider.  However she cannot afford that at this time.  This lesion is approximately 1.5  cm by half a centimeter.  Thus patient would like to have this be excised.  We talked about the risks, benefits, alternatives of the procedure the patient would like to proceed.  We will schedule an additional appointment just for the procedure.    I thank LYNSEY Renner  for the opportunity to participate in the patient's care.           Chief Complaint:   External hemorrhoids     History is obtained from the patient         History of Present Illness:   This patient is a 44 year old  female with a significant past medical history of HPV genital warts who presents with external hemorrhoids.    Past two years - get constipated intermittent.  WHen they flare-up - no pain, no itching, palpable, intermittent bleeding. Noted bleeding when she wipes.  No pattern for flare up.  Stool somewhat form.  Do read in toilet intermittent.  Does not do any bowel regimen unless she needs to.  NO crohn's and IBD; maternal aunt has crohn's and colitis (?related to her breast implants.).  Had colonoscopy ~5 years ago.  No hx of abscess, fissues, infection in perianal area.  No intra-abdominal surgeries.  Genital warps along the underwear line - 3x along right side underwear line - increasing in size.            Past Medical History:    has a past medical history of Colon polyps (12/3/14), Diverticulosis (12/3/14), and Seizure disorder (H) (6/17/2008).          Past Surgical History:     Past Surgical History:   Procedure Laterality Date     COLONOSCOPY WITH CO2 INSUFFLATION N/A 12/3/2014    Procedure: COLONOSCOPY WITH CO2 INSUFFLATION;  Surgeon: Duane, William Charles, MD;  Location: [facility] OR     GYN SURGERY  2000    LEEP      LASIK      3/18/2010           Medications:   lamoTRIgine (LAMICTAL) 100 MG tablet, Take 1 tablet (100 mg) by mouth 2 times daily  albuterol (PROAIR HFA/PROVENTIL HFA/VENTOLIN HFA) 108 (90 Base) MCG/ACT inhaler, Inhale 1-2 puffs into the lungs every 4 hours as needed for shortness of breath  / dyspnea or wheezing (Patient not taking: Reported on 11/11/2019)  IBUPROFEN PO,   loratadine (CLARITIN) 10 MG tablet, Take 10 mg by mouth daily.    No current facility-administered medications on file prior to visit.         Allergies:      Allergies   Allergen Reactions     Dilantin [Phenytoin]      Dogs      Dust Mites      Keppra      Phenytoin      Seasonal Allergies      Trees             Social History:   Florentino Mondragon  reports that she quit smoking about 20 years ago. She has never used smokeless tobacco. She reports current alcohol use. She reports that she does not use drugs.          Family History:   The patient has no family history of any bleeding, clotting or anesthesia problems.          Review of Systems:     Constitutional: Denies fever or chills   Eyes: Denies change in visual acuity   HENT: Denies nasal congestion or sore throat   Respiratory: Denies cough or shortness of breath   Cardiovascular: Denies chest pain or edema   GI: Denies abdominal pain, nausea, vomiting, bloody stools or diarrhea   : Denies dysuria   Musculoskeletal: Denies back pain or joint pain   Integument: Denies rash   Neurologic: Denies headache, focal weakness or sensory changes   Endocrine: Denies polyuria or polydipsia   Lymphatic: Denies swollen glands   Psychiatric: Denies depression or anxiety          Physical Exam:     Constitutional: Awake, alert, no acute distress.  Eyes:  No scleral icterus.  Conjunctiva are without injection.  ENMT: Mucous membranes moist, dentition and gums are intact.   Neck: Soft, supple, trachea midline.    Endocrine: The thyroid is without masses and mobile with swallow.   Lymphatic: There is no cervical, submandibular, or inguinal adenopathy.  Respiratory: Lungs are clear to auscultation and percussion bilaterally.   Cardiovascular: Regular rate and rhythm. No murmurs, rubs, or gallops.    Abdomen: Non-distended, non-tender, normoactive bowel sounds present, No masses, neghernias, or  "flank tenderness. No hepatosplenomegaly.   Perianal and Anoscopy: no perianal irritation; anterior midline skin tag with small external hemorrhoid that noted some erythema but no signs of thrombosis.  No internal hemorrhoids noted; no fissure; no discharge; no masses; no bleeding noted.    Musculoskeletal: No spinal or CVA tenderness. Full range of motion in the upper and lower extremities.    Skin: No skin rashes or lesions to inspection.  No petechia.  Right groin - 1.5x0.5cm skin lesion likely genital wart.   Neurologic: Cranial nerves II through XII are grossly intact and symmetric.  Psychiatric: The patient is alert and oriented times 3.  The patient's affect is not blunted and mood is appropriate.          Data:   Vital Signs:  /76   Temp 97.4  F (36.3  C) (Temporal)   Ht 1.575 m (5' 2\")   Wt 64.9 kg (143 lb)   BMI 26.16 kg/m       WBC -   WBC   Date Value Ref Range Status   11/19/2014 5.7 4.0 - 11.0 10e9/L Final   ], HgB -   Hemoglobin   Date Value Ref Range Status   11/19/2014 11.6 (L) 11.7 - 15.7 g/dL Final   ]   Liver Function Studies -   Recent Labs   Lab Test 11/19/14  0902   PROTTOTAL 6.8   ALBUMIN 3.6   BILITOTAL 0.2   ALKPHOS 71   AST 10   ALT 18     No results found for this or any previous visit (from the past 744 hour(s)).     [facility] ramy 11/11/2019 9:03 AM           "

## 2019-11-12 LAB — LAMOTRIGINE SERPL-MCNC: 2.6 UG/ML (ref 2.5–15)

## 2019-11-12 NOTE — RESULT ENCOUNTER NOTE
Jay Good    Your results were normal.     The results are attached for your review.       Chapito Cueva PA-C

## 2019-11-15 ENCOUNTER — OFFICE VISIT (OUTPATIENT)
Dept: SURGERY | Facility: OTHER | Age: 44
End: 2019-11-15
Payer: COMMERCIAL

## 2019-11-15 VITALS
DIASTOLIC BLOOD PRESSURE: 80 MMHG | SYSTOLIC BLOOD PRESSURE: 130 MMHG | BODY MASS INDEX: 26.59 KG/M2 | TEMPERATURE: 98.3 F | HEIGHT: 62 IN | WEIGHT: 144.5 LBS

## 2019-11-15 DIAGNOSIS — L98.9 SKIN LESION: ICD-10-CM

## 2019-11-15 DIAGNOSIS — A63.0 GENITAL WARTS DUE TO HPV (HUMAN PAPILLOMAVIRUS): Primary | ICD-10-CM

## 2019-11-15 PROCEDURE — 88305 TISSUE EXAM BY PATHOLOGIST: CPT | Mod: TC | Performed by: SURGERY

## 2019-11-15 PROCEDURE — 11401 EXC TR-EXT B9+MARG 0.6-1 CM: CPT | Performed by: SURGERY

## 2019-11-15 ASSESSMENT — MIFFLIN-ST. JEOR: SCORE: 1248.7

## 2019-11-15 NOTE — PATIENT INSTRUCTIONS
[facility]    Home Care Following AMBULATORY SURGERY    Dr. Salinas     Care of the Incision:    Dermabond dressing is in place.  No other dressing is needed.    On post-op day 1 you may shower, but don t soak in a tub or swim for at least 1 week.  Gently pat your incision dry with a freshly laundered towel.    Do not pick at your incision.    Leave your incision open to air.  Cover it only if clothing rubs or irritates it.    ______________________________________________________    Gauze dressing is in place.  Remove the dressing in 24 hours (replace it earlier if it is heavily soiled).  At 24 hours you may wash it in the shower with soap and water, but do not soak in a tub or swim for at least 1 week.  Gently pat your incision dry with a freshly laundered towel.  Either replace the dressing or leave it open to air according to your doctor's instructions.    ______________________________________________________  Activity:    Gradually increase your activity.  Walk short distances several times each day and increase the distance as your strength allows.    To promote circulation, do not cross your legs while sitting.    No strenuous lifting (20 pounds) or straining for 6 weeks (2 weeks for laparoscopic surgery).      Return to work will be determined by the type of work you do and should be discussed with your physician.    By 2 weeks after surgery, you may do any non-strenuous activity you feel like doing as long as you STOP IF IT HURTS.    Do not drive or operate equipment while taking prescription pain medicines.  You may drive 1 week after surgery if you have stopped taking prescription pain medicines and are pain-free enough to make an emergency stop if necessary.    Diet:    Return to the diet you were on before surgery.    Drink plenty of  water and fruit juices.    Avoid foods that cause constipation.      REMEMBER--most prescription pain pills cause constipation.  Walking, extra fluids, and  increased fiber (fresh fruits and vegetables, etc.) are natural remedies for constipation.  Ask your doctor about a stool softener such as Colace or Metamucil.    Call Your Physician if You Have:    Redness, increased swelling or cloudy drainage from your incision.    A temperature of more than 101 degrees F.    Worsening pain in your incision not relieved by your prescription pain pills and/or a short rest.  Persistent nausea/vomiting. Jaundice. Worsening abdominal pain. Shortness of breath or difficulty swallowing.    Any questions or concerns about your recovery, please call [phone].    Follow-up Care:    Make an appointment 1 week after your surgery.  Call [phone].

## 2019-11-15 NOTE — LETTER
11/15/2019         RE: Florentino Mondragon  [address]  [place] [address]        Dear Colleague,    Thank you for referring your patient, Florentino Mondragon, to the [facility]. Please see a copy of my visit note below.      [facility]    Home Care Following AMBULATORY SURGERY    Dr. Salinas     Care of the Incision:    Dermabond dressing is in place.  No other dressing is needed.    On post-op day 1 you may shower, but don t soak in a tub or swim for at least 1 week.  Gently pat your incision dry with a freshly laundered towel.    Do not pick at your incision.    Leave your incision open to air.  Cover it only if clothing rubs or irritates it.    ______________________________________________________    Gauze dressing is in place.  Remove the dressing in 24 hours (replace it earlier if it is heavily soiled).  At 24 hours you may wash it in the shower with soap and water, but do not soak in a tub or swim for at least 1 week.  Gently pat your incision dry with a freshly laundered towel.  Either replace the dressing or leave it open to air according to your doctor's instructions.    ______________________________________________________  Activity:    Gradually increase your activity.  Walk short distances several times each day and increase the distance as your strength allows.    To promote circulation, do not cross your legs while sitting.    No strenuous lifting (20 pounds) or straining for 6 weeks (2 weeks for laparoscopic surgery).      Return to work will be determined by the type of work you do and should be discussed with your physician.    By 2 weeks after surgery, you may do any non-strenuous activity you feel like doing as long as you STOP IF IT HURTS.    Do not drive or operate equipment while taking prescription pain medicines.  You may drive 1 week after surgery if you have stopped taking prescription pain medicines and are pain-free enough to make an emergency stop if  necessary.    Diet:    Return to the diet you were on before surgery.    Drink plenty of  water and fruit juices.    Avoid foods that cause constipation.      REMEMBER--most prescription pain pills cause constipation.  Walking, extra fluids, and increased fiber (fresh fruits and vegetables, etc.) are natural remedies for constipation.  Ask your doctor about a stool softener such as Colace or Metamucil.    Call Your Physician if You Have:    Redness, increased swelling or cloudy drainage from your incision.    A temperature of more than 101 degrees F.    Worsening pain in your incision not relieved by your prescription pain pills and/or a short rest.  Persistent nausea/vomiting. Jaundice. Worsening abdominal pain. Shortness of breath or difficulty swallowing.    Any questions or concerns about your recovery, please call [phone].    Follow-up Care:    Make an appointment 1 week after your surgery.  Call [phone].      The History and Physical has been reviewed, the patient has been examined and no changes have occurred in the patient's condition since the H & P was completed.       ViktorRahath Rita, DO      Again, thank you for allowing me to participate in the care of your patient.        Sincerely,        Mackenzie Salinas MD

## 2019-11-15 NOTE — PROGRESS NOTES
[facility]    Home Care Following AMBULATORY SURGERY    Dr. Salinas     Care of the Incision:    Dermabond dressing is in place.  No other dressing is needed.    On post-op day 1 you may shower, but don t soak in a tub or swim for at least 1 week.  Gently pat your incision dry with a freshly laundered towel.    Do not pick at your incision.    Leave your incision open to air.  Cover it only if clothing rubs or irritates it.    ______________________________________________________    Gauze dressing is in place.  Remove the dressing in 24 hours (replace it earlier if it is heavily soiled).  At 24 hours you may wash it in the shower with soap and water, but do not soak in a tub or swim for at least 1 week.  Gently pat your incision dry with a freshly laundered towel.  Either replace the dressing or leave it open to air according to your doctor's instructions.    ______________________________________________________  Activity:    Gradually increase your activity.  Walk short distances several times each day and increase the distance as your strength allows.    To promote circulation, do not cross your legs while sitting.    No strenuous lifting (20 pounds) or straining for 6 weeks (2 weeks for laparoscopic surgery).      Return to work will be determined by the type of work you do and should be discussed with your physician.    By 2 weeks after surgery, you may do any non-strenuous activity you feel like doing as long as you STOP IF IT HURTS.    Do not drive or operate equipment while taking prescription pain medicines.  You may drive 1 week after surgery if you have stopped taking prescription pain medicines and are pain-free enough to make an emergency stop if necessary.    Diet:    Return to the diet you were on before surgery.    Drink plenty of  water and fruit juices.    Avoid foods that cause constipation.      REMEMBER--most prescription pain pills cause constipation.  Walking, extra fluids, and  increased fiber (fresh fruits and vegetables, etc.) are natural remedies for constipation.  Ask your doctor about a stool softener such as Colace or Metamucil.    Call Your Physician if You Have:    Redness, increased swelling or cloudy drainage from your incision.    A temperature of more than 101 degrees F.    Worsening pain in your incision not relieved by your prescription pain pills and/or a short rest.  Persistent nausea/vomiting. Jaundice. Worsening abdominal pain. Shortness of breath or difficulty swallowing.    Any questions or concerns about your recovery, please call [phone].    Follow-up Care:    Make an appointment 1 week after your surgery.  Call [phone].

## 2019-11-17 NOTE — PROCEDURES
EXCISION PROCEDURE NOTE    Name: Florentino MOFFETT [facility]: [unfilled]   : 1975, 44 year old Room/Bed: Room/bed info not found   CSN: [id number] Admission: No admission date for patient encounter.   MRN: [id number] Today: 2019,     PCP: Chelsea Cueva Attending: No att. providers found       PROCEDURE:   1. Excision of right groin skin lesion 1x0.5cm      INDICATION: abnl skin lesion - hx of genital warts    PRE-PROCEDURE     : Mackenzie Salinas MD  CONSENT: This procedure has been fully reviewed with the patient and written informed consent has been obtained.      PROCEDURE     The right groin area was prepped and appropriately anesthetized with 1% lidocaine with epinephrine. Using the usual technique, full thickness elliptical excision in total was performed.  Utilizing a #15 blade scalpel, an incision was made over the area of the palpable mass.  Adsons forceps were used to elevate the skin edge and blunt dissection utilizing a curved hemostat was done to separate the area of concern from the deep dermis. Hemostasis was achieved by holding pressure and injecting more local anesthetic with epinephrine, copious irrigation was then used to clean the wound. Hemostasis was achieved.  The total area excised, including lesion and margins was 1.0x0.5cm.  The skin was then closed with 4-0 prolene in a simple interrupted fashion.  Incision was covered with bandaid.  The procedure was well tolerated and without complications. Specimen was sent to Pathology      POST-PROCEDURE     The patient tolerated the procedure well    COMPLICATIONS: none    Plan:  1. Instructed to keep the wound dry and covered for 24-48h and clean thereafter.  2. Warning signs of infection were reviewed.    3. Recommended that the patient use OTC acetaminophen, OTC ibuprofen as needed for pain.         Electronically signed by: Mackenzie Salinas MD; 2019

## 2019-11-17 NOTE — PROGRESS NOTES
The History and Physical has been reviewed, the patient has been examined and no changes have occurred in the patient's condition since the H & P was completed.       [facility], DO

## 2019-11-19 LAB — COPATH REPORT: NORMAL

## 2019-12-06 ENCOUNTER — TRANSFERRED RECORDS (OUTPATIENT)
Dept: HEALTH INFORMATION MANAGEMENT | Facility: CLINIC | Age: 44
End: 2019-12-06

## 2020-02-06 ENCOUNTER — OFFICE VISIT (OUTPATIENT)
Dept: DERMATOLOGY | Facility: CLINIC | Age: 45
End: 2020-02-06
Payer: COMMERCIAL

## 2020-02-06 DIAGNOSIS — D22.9 MULTIPLE BENIGN NEVI: ICD-10-CM

## 2020-02-06 DIAGNOSIS — L21.9 DERMATITIS, SEBORRHEIC: ICD-10-CM

## 2020-02-06 DIAGNOSIS — L82.1 SEBORRHEIC KERATOSIS: ICD-10-CM

## 2020-02-06 DIAGNOSIS — L57.8 SUN-DAMAGED SKIN: Primary | ICD-10-CM

## 2020-02-06 DIAGNOSIS — L81.4 SOLAR LENTIGO: ICD-10-CM
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
## 2020-02-06 DIAGNOSIS — D18.01 CHERRY ANGIOMA: ICD-10-CM

## 2020-02-06 PROBLEM — G43.909 MIGRAINE: Status: ACTIVE | Noted: 2020-02-06

## 2020-02-06 PROCEDURE — 99203 OFFICE O/P NEW LOW 30 MIN: CPT | Performed by: DERMATOLOGY

## 2020-02-06 RX ORDER — FLUOCINOLONE ACETONIDE 0.11 MG/ML
OIL TOPICAL
Qty: 118.28 ML | Refills: 11 | Status: SHIPPED | OUTPATIENT
Start: 2020-02-06

## 2020-02-06 NOTE — PROGRESS NOTES
"[facility] Dermatology Note    Dermatology Problem List:  1. Current tanning bed usage  2. Seborrheic dermatitis, left conchal bowl  -Curernt t/x: otc Head and Shoulders shampoo, Dermasmoothe 0.01% oil as needed  3. 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 Florentino Mondragon is a 44 year old female who presents in self referral for a skin check. She has no personal history of skin cancer and a family history of BCC in her mother. Today she reports a dark brown lesion of concern on her lower abdomen she would like to have examined. It seems to be growing outwards but does not hurt or spontaneously bleed. She currently uses a tanning bed. She states that this makes her feel better because it makes her feel better emotionally and look better physically. She is currently going through a break-up with her  (soon to be ) and this is her \"therapy\". She also uses infrared light therapy. Denies any tender, nonhealing, bleeding skin lesions.      Additionally she is wondering how to fully treat the dandruff in her ear. Her family advised her to apply Head and Shoulders shampoo in the area and then coconut oil over top. This seems to help but it will come back in a few days. She wonders if there is anything else she can use.    Additionally she notes a history of pitted keratolysis of the left foot. She uses BPO cream on this which does seem to help when it is present. She wonders if there is anything else she should be doing.     No other concerns addressed today.    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 Tanning bed usage, current use. Currently going through a divorce.     Family History:  History of BCC in her mother    Medications:  Current Outpatient Medications   Medication Sig Dispense Refill     IBUPROFEN PO        lamoTRIgine (LAMICTAL) 100 MG tablet Take 1 tablet (100 mg) by mouth 2 times daily 60 tablet 2     loratadine (CLARITIN) 10 MG tablet Take 10 mg by mouth daily.       albuterol (PROAIR HFA/PROVENTIL HFA/VENTOLIN HFA) 108 (90 Base) MCG/ACT inhaler Inhale 1-2 puffs into the lungs every 4 hours as needed for shortness of breath / dyspnea or wheezing (Patient not taking: Reported on 2/6/2020) 3 Inhaler 3       Allergies   Allergen Reactions     Dilantin [Phenytoin]      Dogs      Dust Mites      Keppra      Phenytoin      Seasonal Allergies      Trees        Review of Systems:  -Constitutional: Patient is otherwise feeling well, in usual state of health.   -Skin: As above in HPI. No additional skin concerns.    Physical exam:  Vitals: There were no vitals taken for this visit.  GEN: This is a well developed, well-nourished female in no acute distress, in a pleasant mood.    SKIN: Total skin excluding the undergarment areas was performed. The exam included the head/face, neck, both arms, chest, back, abdomen, both legs, digits and/or nails and buttocks.   - Coker Type II-III  - Scattered brown macules on  sun exposed areas.  - Extremely tanned on exam today. Dyspigmentation on the central face.   - Left conchal bowl: there is subtle pink erythema with skin colored scale  - There are dome shaped bright red papules on the trunk.   - Multiple regular brown pigmented macules and papules are identified on the body. Around 40-50 nevi. All uniform.   - There are waxy stuck on tan to brown papules on the trunk (area of patient concern).  - No other lesions of concern on areas examined.     Impression/Plan:    1. Sun damaged skin with solar lentigines. Current tanning bed usage. Reviewed the risks of tanning bed usage and strongly advised patient to discontinue use. She is willing to consider this.   - Recommend sunscreens SPF #30 or greater, protective clothing and avoidance of tanning beds.    2. Benign findings including: seborrheic keratoses, cherry angioma  - No further intervention required. Patient to report changes.   - Patient reassured of the benign nature of these lesions.    3. Multiple clinically benign nevi  - No further intervention required. Patient to report changes.   - Patient reassured of the benign nature of these lesions.    4.  Seborrheic dermatitis. Discussed with the patient that the pathogenesis of the condition is due to the skin reacting with the yeast that is present. Discussed that this is a chronic condition but responds well to treatment.   - Prescribed Dermasmoothe 0.01% oil to be used PRN. If not covered by insurance, I would try prescribing fluocinonide 0.05% solution instead. Discussed steroids should be used sparingly.   - Advised patient to continue to use otc Head and shoulders shampoo in the area.     5.  Pitted keratolysis, not present today. Advised patient to continue to use otc BPO leave on product on the area as needed.     Follow-up in 1 year for skin check, earlier for new or changing lesions.     Staff Involved:  Scribe/Staff    Scribe Disclosure  Leonila RODRIGEZ, am serving  as a scribe to document services personally performed by Dr. Anjelica Pereira MD, based on data collection and the provider's statements to me.     Provider Disclosure:   The documentation recorded by the scribe accurately reflects the services I personally performed and the decisions made by me.    Anjelica Pereira MD    Department of Dermatology  [facility] [place]: Phone: [phone], Fax:[phone]  [facility]: Phone: [phone], Fax: [phone]

## 2020-02-06 NOTE — NURSING NOTE
Florentino Mondragon's goals for this visit include:   Chief Complaint   Patient presents with     Skin Check     [facility], mom hx of skin cancer, no personal of skin cancer, not immunosupressed       She requests these members of her care team be copied on today's visit information: no    PCP: Chelsea Cueva    Referring Provider:  No referring provider defined for this encounter.    There were no vitals taken for this visit.    Do you need any medication refills at today's visit? No    Radha Gatica LPN

## 2020-02-06 NOTE — LETTER
"    2/6/2020         RE: Florentino Mondragon  [address]  [place] [address]        Dear Colleague,    Thank you for referring your patient, Florentino Mondragon, to the [facility]. Please see a copy of my visit note below.    [facility] Dermatology Note    Dermatology Problem List:  1. Current tanning bed usage  2. Seborrheic dermatitis, left conchal bowl  -Curernt t/x: otc Head and Shoulders shampoo, Dermasmoothe 0.01% oil as needed  3. 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 Florentino Mondragon is a 44 year old female who presents in self referral for a skin check. She has no personal history of skin cancer and a family history of BCC in her mother. Today she reports a dark brown lesion of concern on her lower abdomen she would like to have examined. It seems to be growing outwards but does not hurt or spontaneously bleed. She currently uses a tanning bed. She states that this makes her feel better because it makes her feel better emotionally and look better physically. She is currently going through a break-up with her  (soon to be ) and this is her \"therapy\". She also uses infrared light therapy. Denies any tender, nonhealing, bleeding skin lesions.      Additionally she is wondering how to fully treat the dandruff in her ear. Her family advised her to apply Head and Shoulders shampoo in the area and then coconut oil over top. This seems to help but it will come back in a few days. She wonders if there is anything else she can use.    Additionally she notes a history of pitted keratolysis of the left foot. She uses BPO cream on this which does seem to help when it is present. She wonders if there is anything else she should be " doing.     No other concerns addressed today.    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 Tanning bed usage, current use. Currently going through a divorce.     Family History:  History of BCC in her mother    Medications:  Current Outpatient Medications   Medication Sig Dispense Refill     IBUPROFEN PO        lamoTRIgine (LAMICTAL) 100 MG tablet Take 1 tablet (100 mg) by mouth 2 times daily 60 tablet 2     loratadine (CLARITIN) 10 MG tablet Take 10 mg by mouth daily.       albuterol (PROAIR HFA/PROVENTIL HFA/VENTOLIN HFA) 108 (90 Base) MCG/ACT inhaler Inhale 1-2 puffs into the lungs every 4 hours as needed for shortness of breath / dyspnea or wheezing (Patient not taking: Reported on 2/6/2020) 3 Inhaler 3       Allergies   Allergen Reactions     Dilantin [Phenytoin]      Dogs      Dust Mites      Keppra      Phenytoin      Seasonal Allergies      Trees        Review of Systems:  -Constitutional: Patient is otherwise feeling well, in usual state of health.   -Skin: As above in HPI. No additional skin concerns.    Physical exam:  Vitals: There were no vitals taken for this visit.  GEN: This is a well developed, well-nourished female in no acute distress, in a pleasant mood.     SKIN: Total skin excluding the undergarment areas was performed. The exam included the head/face, neck, both arms, chest, back, abdomen, both legs, digits and/or nails and buttocks.   - Coker Type II-III  - Scattered brown macules on sun exposed areas.  - Extremely tanned on exam today. Dyspigmentation on the central face.   - Left conchal bowl: there is subtle pink erythema with skin colored scale  - There are dome shaped bright red papules on the trunk.   - Multiple regular brown pigmented macules and papules are identified on the body. Around 40-50 nevi. All uniform.   - There are waxy stuck on tan to brown papules on the trunk (area of patient concern).  - No other lesions of concern on areas examined.     Impression/Plan:    1. Sun damaged skin with solar lentigines. Current tanning bed usage. Reviewed the risks of tanning bed usage and strongly advised patient to discontinue use. She is willing to consider this.   - Recommend sunscreens SPF #30 or greater, protective clothing and avoidance of tanning beds.    2. Benign findings including: seborrheic keratoses, cherry angioma  - No further intervention required. Patient to report changes.   - Patient reassured of the benign nature of these lesions.    3. Multiple clinically benign nevi  - No further intervention required. Patient to report changes.   - Patient reassured of the benign nature of these lesions.    4.  Seborrheic dermatitis. Discussed with the patient that the pathogenesis of the condition is due to the skin reacting with the yeast that is present. Discussed that this is a chronic condition but responds well to treatment.   - Prescribed Dermasmoothe 0.01% oil to be used PRN. If not covered by insurance, I would try prescribing fluocinonide 0.05% solution instead. Discussed steroids should be used sparingly.   - Advised patient to continue to use otc Head and shoulders shampoo in the area.     5.  Pitted keratolysis, not present today.  Advised patient to continue to use otc BPO leave on product on the area as needed.     Follow-up in 1 year for skin check, earlier for new or changing lesions.     Staff Involved:  Scribe/Staff    Scribe Disclosure  I, Leonila Nayak, am serving as a scribe to document services personally performed by Dr. Anjelica Pereira MD, based on data collection and the provider's statements to me.     Provider Disclosure:   The documentation recorded by the scribe accurately reflects the services I personally performed and the decisions made by me.    Anjelica Pereira MD    Department of Dermatology  [facility]: Phone: [phone], Fax:[phone]  [facility]: Phone: [phone], Fax: [phone]                Again, thank you for allowing me to participate in the care of your patient.        Sincerely,        Anjelica Pereira MD

## 2020-02-06 NOTE — PATIENT INSTRUCTIONS
Preventive Care:    Colorectal Cancer Screening: During our visit today, we discussed that it is recommended you receive colorectal cancer screening. Please call or make an appointment with your primary care provider to discuss this. You may also call the [facility] scheduling line ([phone]) to set up a colonoscopy appointment.

## 2020-07-06 ENCOUNTER — TELEPHONE (OUTPATIENT)
Dept: FAMILY MEDICINE | Facility: OTHER | Age: 45
End: 2020-07-06

## 2020-07-06 NOTE — TELEPHONE ENCOUNTER
Summary:    Patient is due/failing the following:   COLONOSCOPY, MAMMOGRAM and PHYSICAL    Action needed:   Patient needs office visit for Physical . and schedule a mammogram and colonoscopy.    Type of outreach:    Phone, left message for patient to call back.     Questions for provider review:    None                                                                                                                                    Jenni Castaneda CMA       Chart routed to Care Team .

## 2020-09-15 ENCOUNTER — MYC REFILL (OUTPATIENT)
Dept: DERMATOLOGY | Facility: CLINIC | Age: 45
End: 2020-09-15

## 2020-09-15 DIAGNOSIS — L21.9 DERMATITIS, SEBORRHEIC: ICD-10-CM

## 2020-09-15 RX ORDER — FLUOCINOLONE ACETONIDE 0.11 MG/ML
OIL TOPICAL
Qty: 118.28 ML | Refills: 11 | Status: CANCELLED | OUTPATIENT
Start: 2020-09-15

## 2020-09-15 ASSESSMENT — ENCOUNTER SYMPTOMS
NAUSEA: 0
HEMATURIA: 0
PARESTHESIAS: 0
FEVER: 0
DYSURIA: 0
HEADACHES: 0
HEARTBURN: 0
CONSTIPATION: 0
HEMATOCHEZIA: 0
SORE THROAT: 0
WEAKNESS: 0
EYE PAIN: 0
SHORTNESS OF BREATH: 0
CHILLS: 0
FREQUENCY: 0
ARTHRALGIAS: 0
DIARRHEA: 0
MYALGIAS: 0
PALPITATIONS: 0
DIZZINESS: 0
BREAST MASS: 0
JOINT SWELLING: 0
ABDOMINAL PAIN: 0
COUGH: 0
NERVOUS/ANXIOUS: 0
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
## 2020-09-21 ENCOUNTER — OFFICE VISIT (OUTPATIENT)
Dept: FAMILY MEDICINE | Facility: OTHER | Age: 45
End: 2020-09-21
Payer: COMMERCIAL

## 2020-09-21 ENCOUNTER — ANCILLARY PROCEDURE (OUTPATIENT)
Dept: GENERAL RADIOLOGY | Facility: OTHER | Age: 45
End: 2020-09-21
Attending: PHYSICIAN ASSISTANT
Payer: COMMERCIAL

## 2020-09-21 VITALS
OXYGEN SATURATION: 99 % | SYSTOLIC BLOOD PRESSURE: 118 MMHG | HEART RATE: 82 BPM | HEIGHT: 62 IN | RESPIRATION RATE: 16 BRPM | TEMPERATURE: 98.6 F | DIASTOLIC BLOOD PRESSURE: 68 MMHG | WEIGHT: 150 LBS | BODY MASS INDEX: 27.6 KG/M2

## 2020-09-21 DIAGNOSIS — M25.561 CHRONIC PAIN OF BOTH KNEES: ICD-10-CM

## 2020-09-21 DIAGNOSIS — M25.562 CHRONIC PAIN OF BOTH KNEES: ICD-10-CM

## 2020-09-21 DIAGNOSIS — Z12.11 SCREENING FOR COLON CANCER: ICD-10-CM

## 2020-09-21 DIAGNOSIS — Z86.0100 HISTORY OF COLONIC POLYPS: ICD-10-CM

## 2020-09-21 DIAGNOSIS — G40.909 SEIZURE DISORDER (H): ICD-10-CM

## 2020-09-21 DIAGNOSIS — G89.29 CHRONIC PAIN OF BOTH KNEES: ICD-10-CM

## 2020-09-21 DIAGNOSIS — Z00.00 ROUTINE HISTORY AND PHYSICAL EXAMINATION OF ADULT: Primary | ICD-10-CM

## 2020-09-21 DIAGNOSIS — N95.1 PERIMENOPAUSE: ICD-10-CM

## 2020-09-21 DIAGNOSIS — Z12.31 ENCOUNTER FOR SCREENING MAMMOGRAM FOR BREAST CANCER: ICD-10-CM

## 2020-09-21 DIAGNOSIS — Z13.1 SCREENING FOR DIABETES MELLITUS: ICD-10-CM

## 2020-09-21 DIAGNOSIS — Z13.220 SCREENING FOR LIPID DISORDERS: ICD-10-CM

## 2020-09-21 DIAGNOSIS — J45.20 MILD INTERMITTENT ASTHMA WITHOUT COMPLICATION: ICD-10-CM

## 2020-09-21 LAB
CHOLEST SERPL-MCNC: 179 MG/DL
FSH SERPL-ACNC: 16.9 IU/L
GLUCOSE SERPL-MCNC: 84 MG/DL (ref 70–99)
HDLC SERPL-MCNC: 73 MG/DL
LDLC SERPL CALC-MCNC: 96 MG/DL
NONHDLC SERPL-MCNC: 106 MG/DL
TRIGL SERPL-MCNC: 48 MG/DL
TSH SERPL DL<=0.005 MIU/L-ACNC: 1.45 MU/L (ref 0.4–4)

## 2020-09-21 PROCEDURE — 84443 ASSAY THYROID STIM HORMONE: CPT | Performed by: PHYSICIAN ASSISTANT

## 2020-09-21 PROCEDURE — 80061 LIPID PANEL: CPT | Performed by: PHYSICIAN ASSISTANT

## 2020-09-21 PROCEDURE — 80175 DRUG SCREEN QUAN LAMOTRIGINE: CPT | Mod: 90 | Performed by: PHYSICIAN ASSISTANT

## 2020-09-21 PROCEDURE — 99000 SPECIMEN HANDLING OFFICE-LAB: CPT | Performed by: PHYSICIAN ASSISTANT

## 2020-09-21 PROCEDURE — 82947 ASSAY GLUCOSE BLOOD QUANT: CPT | Performed by: PHYSICIAN ASSISTANT

## 2020-09-21 PROCEDURE — 99396 PREV VISIT EST AGE 40-64: CPT | Performed by: PHYSICIAN ASSISTANT

## 2020-09-21 PROCEDURE — 99213 OFFICE O/P EST LOW 20 MIN: CPT | Mod: 25 | Performed by: PHYSICIAN ASSISTANT

## 2020-09-21 PROCEDURE — 36415 COLL VENOUS BLD VENIPUNCTURE: CPT | Performed by: PHYSICIAN ASSISTANT

## 2020-09-21 PROCEDURE — 73560 X-RAY EXAM OF KNEE 1 OR 2: CPT | Mod: RT

## 2020-09-21 PROCEDURE — 83001 ASSAY OF GONADOTROPIN (FSH): CPT | Performed by: PHYSICIAN ASSISTANT

## 2020-09-21 RX ORDER — ALBUTEROL SULFATE 90 UG/1
2 AEROSOL, METERED RESPIRATORY (INHALATION) EVERY 4 HOURS PRN
Qty: 18 G | Refills: 4 | Status: SHIPPED | OUTPATIENT
Start: 2020-09-21

## 2020-09-21 ASSESSMENT — ENCOUNTER SYMPTOMS
PARESTHESIAS: 0
SORE THROAT: 0
ARTHRALGIAS: 0
DIZZINESS: 0
MYALGIAS: 0
HEARTBURN: 0
HEMATURIA: 0
JOINT SWELLING: 0
SHORTNESS OF BREATH: 0
BREAST MASS: 0
FREQUENCY: 0
WEAKNESS: 0
NERVOUS/ANXIOUS: 0
HEMATOCHEZIA: 0
CONSTIPATION: 0
DIARRHEA: 0
ABDOMINAL PAIN: 0
EYE PAIN: 0
FEVER: 0
COUGH: 0
CHILLS: 0
DYSURIA: 0
HEADACHES: 0
NAUSEA: 0
PALPITATIONS: 0

## 2020-09-21 ASSESSMENT — MIFFLIN-ST. JEOR: SCORE: 1268.65

## 2020-09-21 NOTE — PATIENT INSTRUCTIONS
Patient Education     Osteoarthritis  Osteoarthritis (also called degenerative joint disease) happens when the cartilage in a joint becomes damaged and worn. This may be due to age, wear and tear, overuse of the joint, or other problems. Osteoarthritis can affect any joint. But it is most common in hands, knees, spine, hips, and feet. Symptoms include joint stiffness, pain, and swelling.  Home care    When a joint is more sore than usual, rest it for a day or two.    Heat can help relieve stiffness. Take a hot bath or apply a heating pad for up to 30 minutes at a time. If symptoms are worse in the morning, using heat just after awakening can help relax the muscle and soothe the joints.     Ice helps relieve pain and swelling. It is often used after activity. Use a cold pack wrapped in a thin cloth on the joint for 10 to 15 minutes at a time.     Alternating hot and cold can also help relieve pain. Try this for 20 minutes at a time, several times per day.    Exercise helps prevent the muscles and ligaments around the joint from becoming weak. It also helps maintain function in the joint.  Be as active as you can. Talk to your healthcare provider about what activity program is best for you.    Excess weight puts a lot of extra strain on weight-bearing joints of the lower back, hips, knees, feet and ankles. If you are overweight, talk to your healthcare provider about a safe and effective weight loss program.    Use anti-inflammatory medicines as prescribed for pain. This includes acetaminophen or NSAIDs such as ibuprofen or naproxen. If needed, topical or injected medicines may be recommended. Talk to your healthcare provider if these options are not enough to manage your pain.    Talk with your healthcare provider about devices that might help improve your function and reduce pain.  Follow-up care  Follow up with your healthcare provider as advised by our staff.  When to seek medical advice  Call your healthcare  provider right away if any of these occur:    Redness or swelling of a painful joint    Discharge or pus from a painful joint    Fever of 100.4 F (38 C) or higher, or as directed by your healthcare provider    Worsening joint pain    Decreased ability to move the joint or bear weight on the joint  Date Last Reviewed: 3/1/2017    2203-9190 [employer]. [address] [place] [address]. All rights reserved. This information is not intended as a substitute for professional medical care. Always follow your healthcare professional's instructions.

## 2020-09-21 NOTE — LETTER
My Asthma Action Plan    Name: Florentino Mondragon   YOB: 1975  Date: 9/22/2020   My doctor: Chelsea Cueva PA-C   My clinic: [facility]        My Rescue Medicine:   Albuterol inhaler (Proair/Ventolin/Proventil HFA)  2-4 puffs EVERY 4 HOURS as needed. Use a spacer if recommended by your provider.   My Asthma Severity:   Intermittent / Exercise Induced  Know your asthma triggers: upper respiratory infections and pollens  dust mites  animal dander          GREEN ZONE   Good Control    I feel good    No cough or wheeze    Can work, sleep and play without asthma symptoms       Take your asthma control medicine every day.     1. If exercise triggers your asthma, take your rescue medication    15 minutes before exercise or sports, and    During exercise if you have asthma symptoms  2. Spacer to use with inhaler: If you have a spacer, make sure to use it with your inhaler             YELLOW ZONE Getting Worse  I have ANY of these:    I do not feel good    Cough or wheeze    Chest feels tight    Wake up at night   1. Keep taking your Green Zone medications  2. Start taking your rescue medicine:    every 20 minutes for up to 1 hour. Then every 4 hours for 24-48 hours.  3. If you stay in the Yellow Zone for more than 12-24 hours, contact your doctor.  4. If you do not return to the Green Zone in 12-24 hours or you get worse, start taking your oral steroid medicine if prescribed by your provider.           RED ZONE Medical Alert - Get Help  I have ANY of these:    I feel awful    Medicine is not helping    Breathing getting harder    Trouble walking or talking    Nose opens wide to breathe       1. Take your rescue medicine NOW  2. If your provider has prescribed an oral steroid medicine, start taking it NOW  3. Call your doctor NOW  4. If you are still in the Red Zone after 20 minutes and you have not reached your doctor:    Take your rescue medicine again and    Call 911 or go to  the emergency room right away    See your regular doctor within 2 weeks of an Emergency Room or Urgent Care visit for follow-up treatment.          Annual Reminders:  Meet with Asthma Educator,  Flu Shot in the Fall, consider Pneumonia Vaccination for patients with asthma (aged 19 and older).    Pharmacy:    [id number]  [place], [place] - [address] [facility] [address] [facility] [employer] [facility] [address] [address] [place] [address]    Electronically signed by Chelsea Cueva PA-C   Date: 09/22/20                    Asthma Triggers  How To Control Things That Make Your Asthma Worse    Triggers are things that make your asthma worse.  Look at the list below to help you find your triggers and   what you can do about them. You can help prevent asthma flare-ups by staying away from your triggers.      Trigger                                                          What you can do   Cigarette Smoke  Tobacco smoke can make asthma worse. Do not allow smoking in your home, car or around you.  Be sure no one smokes at a child s day care or school.  If you smoke, ask your health care provider for ways to help you quit.  Ask family members to quit too.  Ask your health care provider for a referral to Quit Plan to help you quit smoking, or call 1-748-683-PLAN.     Colds, Flu, Bronchitis  These are common triggers of asthma. Wash your hands often.  Don t touch your eyes, nose or mouth.  Get a flu shot every year.     Dust Mites  These are tiny bugs that live in cloth or carpet. They are too small to see. Wash sheets and blankets in hot water every week.   Encase pillows and mattress in dust mite proof covers.  Avoid having carpet if you can. If you have carpet, vacuum weekly.   Use a dust mask and HEPA vacuum.   Pollen and Outdoor Mold  Some people are allergic to trees, grass, or weed pollen, or molds. Try to keep your windows closed.  Limit time out doors when pollen count is high.   Ask you health care provider about taking  medicine during allergy season.     Animal Dander  Some people are allergic to skin flakes, urine or saliva from pets with fur or feathers. Keep pets with fur or feathers out of your home.    If you can t keep the pet outdoors, then keep the pet out of your bedroom.  Keep the bedroom door closed.  Keep pets off cloth furniture and away from stuffed toys.     Mice, Rats, and Cockroaches  Some people are allergic to the waste from these pests.   Cover food and garbage.  Clean up spills and food crumbs.  Store grease in the refrigerator.   Keep food out of the bedroom.   Indoor Mold  This can be a trigger if your home has high moisture. Fix leaking faucets, pipes, or other sources of water.   Clean moldy surfaces.  Dehumidify basement if it is damp and smelly.   Smoke, Strong Odors, and Sprays  These can reduce air quality. Stay away from strong odors and sprays, such as perfume, powder, hair spray, paints, smoke incense, paint, cleaning products, candles and new carpet.   Exercise or Sports  Some people with asthma have this trigger. Be active!  Ask your doctor about taking medicine before sports or exercise to prevent symptoms.    Warm up for 5-10 minutes before and after sports or exercise.     Other Triggers of Asthma  Cold air:  Cover your nose and mouth with a scarf.  Sometimes laughing or crying can be a trigger.  Some medicines and food can trigger asthma.

## 2020-09-22 ASSESSMENT — ASTHMA QUESTIONNAIRES: ACT_TOTALSCORE: 23

## 2020-09-22 NOTE — RESULT ENCOUNTER NOTE
Jay Good    Your x-ray results were pretty normal. I still think this is a bit of arthritis. So continue with the things we talked about, if it doesn't get better let me know and I can refer to ortho.     The results are attached for your review.       Chapito Cueva PA-C

## 2020-09-23 LAB — LAMOTRIGINE SERPL-MCNC: 2.7 UG/ML (ref 2.5–15)

## 2020-09-24 NOTE — RESULT ENCOUNTER NOTE
Jay Good    Your results were normal. FSH shows still cycling pretty regularly, no signs of post-menopause.     The results are attached for your review.       Chapito Cueva PA-C

## 2020-10-09 ENCOUNTER — TRANSFERRED RECORDS (OUTPATIENT)
Dept: HEALTH INFORMATION MANAGEMENT | Facility: CLINIC | Age: 45
End: 2020-10-09

## 2020-10-15 ENCOUNTER — ANCILLARY PROCEDURE (OUTPATIENT)
Dept: MAMMOGRAPHY | Facility: OTHER | Age: 45
End: 2020-10-15
Attending: PHYSICIAN ASSISTANT
Payer: COMMERCIAL

## 2020-10-15 DIAGNOSIS — Z12.31 ENCOUNTER FOR SCREENING MAMMOGRAM FOR BREAST CANCER: ICD-10-CM

## 2020-10-15 PROCEDURE — 77067 SCR MAMMO BI INCL CAD: CPT | Performed by: RADIOLOGY

## 2020-10-15 PROCEDURE — 77063 BREAST TOMOSYNTHESIS BI: CPT | Performed by: RADIOLOGY

## 2020-11-10 ENCOUNTER — TELEPHONE (OUTPATIENT)
Dept: DERMATOLOGY | Facility: CLINIC | Age: 45
End: 2020-11-10

## 2020-11-10 NOTE — TELEPHONE ENCOUNTER
1st attempt. Left message for patient to return call. Patient is due for a in person return appointment with Dr Pereira due around 2/6/21. Number to clinic and Mychart option given, please assist in scheduling once patient returns clinic call.     Call Center OKAY TO SCHEDULE.    Thanks,   Deloris Archer  Surgical Specialties Procedure   [facility] [place]  11/10/2020 3:24 PM

## 2020-11-29 ENCOUNTER — HEALTH MAINTENANCE LETTER (OUTPATIENT)
Age: 45
End: 2020-11-29

## 2020-11-30 NOTE — TELEPHONE ENCOUNTER
2nd attempt to schedule as noted below. Message left for patient to return call and schedule.    Deloris Archer  Surgical Specialties Procedure   [facility] [place]  11/30/2020 1:55 PM

## 2021-09-25 ENCOUNTER — HEALTH MAINTENANCE LETTER (OUTPATIENT)
Age: 46
End: 2021-09-25

## 2021-11-20 ENCOUNTER — HEALTH MAINTENANCE LETTER (OUTPATIENT)
Age: 46
End: 2021-11-20

## 2022-01-15 ENCOUNTER — HEALTH MAINTENANCE LETTER (OUTPATIENT)
Age: 47
End: 2022-01-15

## 2022-12-26 ENCOUNTER — HEALTH MAINTENANCE LETTER (OUTPATIENT)
Age: 47
End: 2022-12-26

## 2023-04-16 ENCOUNTER — HEALTH MAINTENANCE LETTER (OUTPATIENT)
Age: 48
End: 2023-04-16

## 2023-09-27 ENCOUNTER — TRANSFERRED RECORDS (OUTPATIENT)
Dept: HEALTH INFORMATION MANAGEMENT | Facility: CLINIC | Age: 48
End: 2023-09-27
Payer: COMMERCIAL

## 2023-10-11 NOTE — TELEPHONE ENCOUNTER
5/14/2018    Call Regarding [facility] Mammogram    Attempt 1    Message on voicemail    Patient is also due for: PAP    Outreach   SV     Reached out to patient as she was unable to get through to the office earlier when the [facility] ([facility]) tried to transfer the call. I apologized for her not being able to speak with someone earlier when she called. 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 Patient is going to email me the form so I can get it to the provider.

## 2024-02-04 ENCOUNTER — HEALTH MAINTENANCE LETTER (OUTPATIENT)
Age: 49
End: 2024-02-04